# Patient Record
Sex: FEMALE | Race: BLACK OR AFRICAN AMERICAN | NOT HISPANIC OR LATINO | Employment: UNEMPLOYED | URBAN - METROPOLITAN AREA
[De-identification: names, ages, dates, MRNs, and addresses within clinical notes are randomized per-mention and may not be internally consistent; named-entity substitution may affect disease eponyms.]

---

## 2023-03-15 LAB
THYROPEROXIDASE AB (IU/ML) IN SER/PLAS: 58 IU/ML
THYROTROPIN (MIU/L) IN SER/PLAS BY DETECTION LIMIT <= 0.05 MIU/L: 0.47 MIU/L (ref 0.44–3.98)
THYROXINE (T4) FREE (NG/DL) IN SER/PLAS: 0.88 NG/DL (ref 0.78–1.48)
TRIIODOTHYRONINE (T3) (NG/DL) IN SER/PLAS: 145 NG/DL (ref 60–200)

## 2023-03-18 LAB
THYROGLOBULIN AB (IU/ML) IN SER/PLAS: <0.9 IU/ML (ref 0–4)
THYROGLOBULIN LC-MS/MS: ABNORMAL NG/ML (ref 1.3–31.8)
THYROGLOBULIN: 647.7 NG/ML (ref 1.3–31.8)

## 2023-03-22 LAB — THYROID STIMULATING IMMUNOGLOBULIN: <1 TSI INDEX

## 2023-03-29 ENCOUNTER — HOSPITAL ENCOUNTER (OUTPATIENT)
Dept: DATA CONVERSION | Facility: HOSPITAL | Age: 63
End: 2023-03-29
Attending: STUDENT IN AN ORGANIZED HEALTH CARE EDUCATION/TRAINING PROGRAM | Admitting: STUDENT IN AN ORGANIZED HEALTH CARE EDUCATION/TRAINING PROGRAM
Payer: COMMERCIAL

## 2023-03-29 DIAGNOSIS — E04.2 NONTOXIC MULTINODULAR GOITER: ICD-10-CM

## 2023-03-29 DIAGNOSIS — E78.5 HYPERLIPIDEMIA, UNSPECIFIED: ICD-10-CM

## 2023-03-29 DIAGNOSIS — E04.9 NONTOXIC GOITER, UNSPECIFIED: ICD-10-CM

## 2023-03-29 DIAGNOSIS — I10 ESSENTIAL (PRIMARY) HYPERTENSION: ICD-10-CM

## 2023-03-29 DIAGNOSIS — I71.40 ABDOMINAL AORTIC ANEURYSM, WITHOUT RUPTURE, UNSPECIFIED (CMS-HCC): ICD-10-CM

## 2023-03-29 DIAGNOSIS — Z79.899 OTHER LONG TERM (CURRENT) DRUG THERAPY: ICD-10-CM

## 2023-03-30 LAB
COMPLETE PATHOLOGY REPORT: NORMAL
CONVERTED CLINICAL DIAGNOSIS-HISTORY: NORMAL
CONVERTED DIAGNOSIS COMMENT: NORMAL
CONVERTED FINAL DIAGNOSIS: NORMAL
CONVERTED FINAL REPORT PDF LINK TO COPY AND PASTE: NORMAL
CONVERTED SPECIMEN DESCRIPTION: NORMAL

## 2023-06-23 ENCOUNTER — TELEPHONE (OUTPATIENT)
Dept: PRIMARY CARE | Facility: CLINIC | Age: 63
End: 2023-06-23
Payer: COMMERCIAL

## 2023-06-26 DIAGNOSIS — E11.9 TYPE 2 DIABETES MELLITUS WITHOUT COMPLICATION, WITHOUT LONG-TERM CURRENT USE OF INSULIN (MULTI): Primary | ICD-10-CM

## 2023-06-26 RX ORDER — METFORMIN HYDROCHLORIDE 500 MG/1
500 TABLET, EXTENDED RELEASE ORAL 2 TIMES DAILY
Qty: 30 TABLET | Refills: 5 | Status: SHIPPED | OUTPATIENT
Start: 2023-06-26 | End: 2023-09-25 | Stop reason: SDUPTHER

## 2023-06-26 RX ORDER — METFORMIN HYDROCHLORIDE 500 MG/1
500 TABLET, EXTENDED RELEASE ORAL 2 TIMES DAILY
COMMUNITY
End: 2023-06-26 | Stop reason: SDUPTHER

## 2023-08-11 RX ORDER — HYDROCHLOROTHIAZIDE 25 MG/1
25 TABLET ORAL DAILY
Qty: 90 TABLET | OUTPATIENT
Start: 2023-08-11

## 2023-08-11 NOTE — TELEPHONE ENCOUNTER
Per last note, patient was started on lisinopril-hydrochlorothiazide 20-25 mg and discontinued hydrochlorothiazide 25 mg. Therefore did not refill medication. If she needs the other anti-hypertension medication refilled let me know.

## 2023-08-15 DIAGNOSIS — I10 HYPERTENSION, UNSPECIFIED TYPE: Primary | ICD-10-CM

## 2023-08-15 RX ORDER — LISINOPRIL AND HYDROCHLOROTHIAZIDE 20; 25 MG/1; MG/1
1 TABLET ORAL DAILY
COMMUNITY
Start: 2023-06-18 | End: 2023-08-15 | Stop reason: SDUPTHER

## 2023-08-15 RX ORDER — HYDROCHLOROTHIAZIDE 25 MG/1
25 TABLET ORAL DAILY
Qty: 90 TABLET | OUTPATIENT
Start: 2023-08-15

## 2023-08-15 RX ORDER — LISINOPRIL AND HYDROCHLOROTHIAZIDE 20; 25 MG/1; MG/1
1 TABLET ORAL DAILY
Qty: 30 TABLET | Refills: 1 | Status: SHIPPED | OUTPATIENT
Start: 2023-08-15 | End: 2023-08-22

## 2023-08-15 NOTE — TELEPHONE ENCOUNTER
Patient was started on lisinopril-hydrochlorothiazide 20-25 mg and discontinue hydrochlorothiazide 25 mg at last visit with Brandon. You can call patient that I refilled combo medication instead and she will need establish with new physician.

## 2023-08-16 NOTE — TELEPHONE ENCOUNTER
Patient states that her pharmacy told her that she does not need the combo med and only needs the Hydrochlorothiazide by itself

## 2023-08-17 ENCOUNTER — TELEPHONE (OUTPATIENT)
Dept: PRIMARY CARE | Facility: CLINIC | Age: 63
End: 2023-08-17
Payer: COMMERCIAL

## 2023-08-17 RX ORDER — HYDROCHLOROTHIAZIDE 25 MG/1
25 TABLET ORAL DAILY
OUTPATIENT
Start: 2023-08-17

## 2023-08-17 RX ORDER — HYDROCHLOROTHIAZIDE 25 MG/1
1 TABLET ORAL DAILY
COMMUNITY
Start: 2019-09-30 | End: 2023-08-22

## 2023-08-17 NOTE — TELEPHONE ENCOUNTER
Pt has called 3 times today stating that she needs her Hydrochlorothiazide. She mentioned that it was sent wrong.

## 2023-08-18 RX ORDER — HYDROCHLOROTHIAZIDE 25 MG/1
25 TABLET ORAL DAILY
Qty: 90 TABLET | OUTPATIENT
Start: 2023-08-18

## 2023-08-18 NOTE — TELEPHONE ENCOUNTER
Patient states that she needs the prescription of Hydrochlorothiazide sent by itself not in combo with lisinopril

## 2023-08-22 DIAGNOSIS — I10 PRIMARY HYPERTENSION: Primary | ICD-10-CM

## 2023-08-22 RX ORDER — HYDROCHLOROTHIAZIDE 25 MG/1
25 TABLET ORAL DAILY
Qty: 90 TABLET | Refills: 0 | Status: SHIPPED | OUTPATIENT
Start: 2023-08-22 | End: 2023-12-27 | Stop reason: WASHOUT

## 2023-08-22 NOTE — PROGRESS NOTES
Patient called and requested refill on hydrochlorothiazide, not combination pill. Last seen Nov 2022. Rx for 3 months, requires in person FUV

## 2023-09-25 ENCOUNTER — LAB (OUTPATIENT)
Dept: LAB | Facility: LAB | Age: 63
End: 2023-09-25
Payer: COMMERCIAL

## 2023-09-25 ENCOUNTER — OFFICE VISIT (OUTPATIENT)
Dept: PRIMARY CARE | Facility: CLINIC | Age: 63
End: 2023-09-25
Payer: COMMERCIAL

## 2023-09-25 VITALS
TEMPERATURE: 98.4 F | HEART RATE: 75 BPM | DIASTOLIC BLOOD PRESSURE: 81 MMHG | BODY MASS INDEX: 24.59 KG/M2 | OXYGEN SATURATION: 99 % | WEIGHT: 133.6 LBS | HEIGHT: 62 IN | SYSTOLIC BLOOD PRESSURE: 164 MMHG

## 2023-09-25 DIAGNOSIS — Z00.00 ROUTINE ADULT HEALTH MAINTENANCE: ICD-10-CM

## 2023-09-25 DIAGNOSIS — E13.9 DIABETES 1.5, MANAGED AS TYPE 2 (MULTI): ICD-10-CM

## 2023-09-25 DIAGNOSIS — E13.9 DIABETES 1.5, MANAGED AS TYPE 2 (MULTI): Primary | ICD-10-CM

## 2023-09-25 DIAGNOSIS — I10 HYPERTENSION, UNSPECIFIED TYPE: ICD-10-CM

## 2023-09-25 DIAGNOSIS — E11.9 TYPE 2 DIABETES MELLITUS WITHOUT COMPLICATION, WITHOUT LONG-TERM CURRENT USE OF INSULIN (MULTI): ICD-10-CM

## 2023-09-25 LAB
ALBUMIN (G/DL) IN SER/PLAS: 4.3 G/DL (ref 3.4–5)
ANION GAP IN SER/PLAS: 13 MMOL/L (ref 10–20)
CALCIUM (MG/DL) IN SER/PLAS: 9.9 MG/DL (ref 8.6–10.6)
CARBON DIOXIDE, TOTAL (MMOL/L) IN SER/PLAS: 30 MMOL/L (ref 21–32)
CHLORIDE (MMOL/L) IN SER/PLAS: 103 MMOL/L (ref 98–107)
CHOLESTEROL (MG/DL) IN SER/PLAS: 219 MG/DL (ref 0–199)
CHOLESTEROL IN HDL (MG/DL) IN SER/PLAS: 54.1 MG/DL
CHOLESTEROL/HDL RATIO: 4
CREATININE (MG/DL) IN SER/PLAS: 0.92 MG/DL (ref 0.5–1.05)
ESTIMATED AVERAGE GLUCOSE FOR HBA1C: 134 MG/DL
GFR FEMALE: 70 ML/MIN/1.73M2
GLUCOSE (MG/DL) IN SER/PLAS: 95 MG/DL (ref 74–99)
HEMOGLOBIN A1C/HEMOGLOBIN TOTAL IN BLOOD: 6.3 %
NON-HDL CHOLESTEROL: 165 MG/DL
PHOSPHATE (MG/DL) IN SER/PLAS: 3.4 MG/DL (ref 2.5–4.9)
POTASSIUM (MMOL/L) IN SER/PLAS: 3.5 MMOL/L (ref 3.5–5.3)
SODIUM (MMOL/L) IN SER/PLAS: 142 MMOL/L (ref 136–145)
UREA NITROGEN (MG/DL) IN SER/PLAS: 17 MG/DL (ref 6–23)

## 2023-09-25 PROCEDURE — 1036F TOBACCO NON-USER: CPT

## 2023-09-25 PROCEDURE — 99213 OFFICE O/P EST LOW 20 MIN: CPT

## 2023-09-25 PROCEDURE — 82465 ASSAY BLD/SERUM CHOLESTEROL: CPT

## 2023-09-25 PROCEDURE — 83036 HEMOGLOBIN GLYCOSYLATED A1C: CPT

## 2023-09-25 PROCEDURE — 3079F DIAST BP 80-89 MM HG: CPT

## 2023-09-25 PROCEDURE — 3077F SYST BP >= 140 MM HG: CPT

## 2023-09-25 PROCEDURE — 83718 ASSAY OF LIPOPROTEIN: CPT

## 2023-09-25 PROCEDURE — 90686 IIV4 VACC NO PRSV 0.5 ML IM: CPT

## 2023-09-25 PROCEDURE — 36415 COLL VENOUS BLD VENIPUNCTURE: CPT

## 2023-09-25 PROCEDURE — 80069 RENAL FUNCTION PANEL: CPT

## 2023-09-25 PROCEDURE — 3044F HG A1C LEVEL LT 7.0%: CPT

## 2023-09-25 PROCEDURE — 90471 IMMUNIZATION ADMIN: CPT

## 2023-09-25 RX ORDER — IBUPROFEN 800 MG/1
TABLET ORAL
COMMUNITY
Start: 2023-09-08 | End: 2024-01-15 | Stop reason: WASHOUT

## 2023-09-25 RX ORDER — LISINOPRIL AND HYDROCHLOROTHIAZIDE 20; 25 MG/1; MG/1
1 TABLET ORAL DAILY
COMMUNITY
Start: 2023-09-12 | End: 2023-12-19 | Stop reason: SDUPTHER

## 2023-09-25 RX ORDER — METFORMIN HYDROCHLORIDE 500 MG/1
500 TABLET, EXTENDED RELEASE ORAL 2 TIMES DAILY
Qty: 30 TABLET | Refills: 5 | Status: SHIPPED | OUTPATIENT
Start: 2023-09-25 | End: 2024-01-02 | Stop reason: SDUPTHER

## 2023-09-25 ASSESSMENT — PATIENT HEALTH QUESTIONNAIRE - PHQ9
1. LITTLE INTEREST OR PLEASURE IN DOING THINGS: NOT AT ALL
SUM OF ALL RESPONSES TO PHQ9 QUESTIONS 1 AND 2: 0
2. FEELING DOWN, DEPRESSED OR HOPELESS: NOT AT ALL

## 2023-09-25 ASSESSMENT — ENCOUNTER SYMPTOMS: DEPRESSION: 0

## 2023-09-25 ASSESSMENT — PAIN SCALES - GENERAL: PAINLEVEL: 0-NO PAIN

## 2023-09-25 NOTE — PROGRESS NOTES
Patient ID: 60 yo F w/ PMHx. DM, HTN, DLD and Goiter who presents for follow-up.     Concerns: none    #HTN   - IO BP of 164/81. Rechecked BP-->171/94  - asymptomatic.   - she does endorse drinking 1/2 cup of coffee before coming to appointment.   - per speaking with patient there appears to be misunderstanding regarding her appropriate medication regimen. At last clinic visit, pt was advised to stop taking her hydrochlorothiazide and instead take the combination pill of lisinopril-hydrochlorothiazide. However the pt had instead continued taking the hydrochlorothiazide mono therapy. This discrepancy was communicated to the patient at length. However it appears the patient can become easily confused about her medications. She states that she lived with her granddaughter at home and she takes care of her own medications. She states that she knows how many medications she needs to take and appears to be very compliant. However she states that the pharmacy had give her both the combination pill and the hydrochlorothiazide pill which led to the confusion. If she is given only the correct medication form the pharmacy she will be able to take the correct medication as directed.   - pt taking hydrochlorothiazide 25 mg,    # c/f Weight loss   - lost 4 lbs since 3/2023  - weight appears to be largely stable and this was communicated to the patient as well as her normal BMI.      #Type 2 diabetes, dyslipidemia  -Last hemoglobin A1c 6.3(11/2022)   -Controlled on metformin 500 mg twice daily  - eats lots of salad with ranch, fruits (pineapple, watermelon)   - cutting adrian on meat.      #thyroid nodule biopsy   #hx of nontoxic multinodular goiter.   - normal biopsy per patient. However path results not able to be located on chart review.   - follows with endocrine.   Has known history of nontoxic multinodular goiter as far back as 2017   most recent thyroid ultrasound done on 12/2022   right midpole nodule 2.4cm solid  per  patient had a F[facility] at [facility] years ago and was reportedly negative   no history of abnormal TFTs in the past     #Depression  - PHQ-2 -ve      Preventative:  -Last Pap (21-65): 2019 not indicated   -Last mammogram: ordered  -Last Colonoscopy (50-75): 2025   -Last LDCT (55-80): n/a  -Last Dental: recommended follow up  -Last Eye exam: recommended follow up  -Last DEXA (65+F): n/a  -Exercise: pt does resistance exercises at home. Encouraged this to increase bone health  -Diet: Pt eats a varied and balanced diet. Counseled on ranch dressing and its high sugar content. Counseled regarding switching to alternative dressing such as italian or a honey+ lemon dressing.   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 constipation  -No changes in urination  -Organ systems reviewed & negative, except as mentioned in HPI    PMH, PSH, SoHx, FamHx and Medication reviewed and edited as indicated.     PHYSICAL EXAM:  -General:  alert, pleasant   -Skin:  no rashes or lesions  -HEENT:  NCAT. moist mucosa.   -Cardiovascular:  Regular rate and rhythm, normal S1 and S2, no gallops, no murmurs and no pericardial rub.  -Pulmonary:  Clear bilateral breath sounds. good chest rise B/L. speaks in full sentences.  -Abdomen:  (+) BS. soft. non-tender. non-distended.  -Neurologic:  grossly intact.  -Musculoskeletal:  Normal gait. Normal Stance. full range of motion in all extremities.  -Psychiatric:  appropriate mood/ affect     ASSESSMENT:  62 yo F w/ PMHx. DM, HTN, DLD and Goiter who presents for  follow-up.     # HTN   - BP not at goal w/ elevated BP of 164/81 and 171/94 on repeat. Pt asymptomatic.   - pt taking hydrochlorothiazide monotherapy instead of lisinopril-hydrochlorothiazide.  -  pt counseled to take combination medication and take home BP's. Counseled pt on appropriate way to take home BP.   - Obtain RFP to assess renal function iso elevated BP    #DMII   - c/w metformin 500 mg BID.   - HbA1c of 6.3 ( 11/2022)   - obtain updated HbA1c     #HM   - Obtain mammogram   - Flu vaccination administered    RTC in 1 month for close BP follow-up     Discussed with attending, Dr. Spencer Charles MD, MPH   Family Medicine and Preventive Health, PGY-2

## 2023-09-25 NOTE — PATIENT INSTRUCTIONS
Thank you for coming in to see us today, Ms. Lupis Staton! It was a pleasure managing your health together.    Today in clinic, we discussed your blood pressure medication. Please take the lisinopril- hydrochlorothiazide pill starting tomorrow.I will discontinue the hydrochlorothiazide.     I ordered a mammogram for you. Please call the number below for an appointment.     Scheduling Phone Numbers:  - Specialty Provider: [phone]  - Heart/Vascular Specialist: [phone]  - [phone]  - PT/OT: [phone]  - Colonoscopy: [phone]     If we ordered bloodwork today, you can get this done at ANY  location and the results will come to me directly. The [facility] here at [facility] are walk-in (no appointment needed); [facility]'s hours are M-F 6:30a-6p and Sat 8a-12p.    If you have any questions/concerns, you can always use e[employer] to message me directly. Or if you prefer, you can call the office at [phone]; if you leave a message, the office staff should translate this to a message in my inbox.    I'm looking forward to seeing you back in clinic in 1 month to discuss your blood pressure .    Best,  Dr. Charles

## 2023-09-28 NOTE — PROGRESS NOTES
I saw and evaluated the patient. I personally obtained the key and critical portions of the history and physical exam or was physically present for key and critical portions performed by the resident/fellow. I reviewed the resident/fellow's documentation and discussed the patient with the resident/fellow. I agree with the resident/fellow's medical decision making as documented in the note.    Odell Palmer MD

## 2023-10-04 ENCOUNTER — APPOINTMENT (OUTPATIENT)
Dept: PRIMARY CARE | Facility: CLINIC | Age: 63
End: 2023-10-04
Payer: COMMERCIAL

## 2023-12-19 ENCOUNTER — TELEPHONE (OUTPATIENT)
Dept: PRIMARY CARE | Facility: CLINIC | Age: 63
End: 2023-12-19

## 2023-12-19 DIAGNOSIS — I10 HYPERTENSION, UNSPECIFIED TYPE: Primary | ICD-10-CM

## 2023-12-19 NOTE — TELEPHONE ENCOUNTER
Pt called requesting refill of lisinopril-hydrochlorothiazide. Order pended and routed to provider.

## 2023-12-20 RX ORDER — LISINOPRIL AND HYDROCHLOROTHIAZIDE 20; 25 MG/1; MG/1
1 TABLET ORAL DAILY
Qty: 30 TABLET | Refills: 3 | Status: SHIPPED | OUTPATIENT
Start: 2023-12-20 | End: 2023-12-27 | Stop reason: SDUPTHER

## 2023-12-27 DIAGNOSIS — I10 HYPERTENSION, UNSPECIFIED TYPE: ICD-10-CM

## 2023-12-27 RX ORDER — LISINOPRIL AND HYDROCHLOROTHIAZIDE 20; 25 MG/1; MG/1
1 TABLET ORAL DAILY
Qty: 30 TABLET | Refills: 11 | Status: SHIPPED | OUTPATIENT
Start: 2023-12-27 | End: 2024-01-15 | Stop reason: SDUPTHER

## 2024-01-02 DIAGNOSIS — E11.9 TYPE 2 DIABETES MELLITUS WITHOUT COMPLICATION, WITHOUT LONG-TERM CURRENT USE OF INSULIN (MULTI): ICD-10-CM

## 2024-01-02 RX ORDER — METFORMIN HYDROCHLORIDE 500 MG/1
500 TABLET, EXTENDED RELEASE ORAL 2 TIMES DAILY
Qty: 30 TABLET | Refills: 5 | Status: SHIPPED | OUTPATIENT
Start: 2024-01-02 | End: 2024-01-15 | Stop reason: SDUPTHER

## 2024-01-15 ENCOUNTER — LAB (OUTPATIENT)
Dept: LAB | Facility: LAB | Age: 64
End: 2024-01-15
Payer: COMMERCIAL

## 2024-01-15 ENCOUNTER — OFFICE VISIT (OUTPATIENT)
Dept: PRIMARY CARE | Facility: CLINIC | Age: 64
End: 2024-01-15
Payer: COMMERCIAL

## 2024-01-15 VITALS
DIASTOLIC BLOOD PRESSURE: 77 MMHG | HEART RATE: 73 BPM | WEIGHT: 137.6 LBS | SYSTOLIC BLOOD PRESSURE: 148 MMHG | OXYGEN SATURATION: 98 % | TEMPERATURE: 98.7 F | BODY MASS INDEX: 25.32 KG/M2 | HEIGHT: 62 IN

## 2024-01-15 DIAGNOSIS — E11.9 TYPE 2 DIABETES MELLITUS WITHOUT COMPLICATION, WITHOUT LONG-TERM CURRENT USE OF INSULIN (MULTI): ICD-10-CM

## 2024-01-15 DIAGNOSIS — I10 HYPERTENSION, UNSPECIFIED TYPE: ICD-10-CM

## 2024-01-15 DIAGNOSIS — E78.5 HYPERLIPIDEMIA, UNSPECIFIED HYPERLIPIDEMIA TYPE: Primary | ICD-10-CM

## 2024-01-15 PROBLEM — K59.00 CONSTIPATION: Status: ACTIVE | Noted: 2024-01-15

## 2024-01-15 PROBLEM — M25.561 BILATERAL KNEE PAIN: Status: ACTIVE | Noted: 2024-01-15

## 2024-01-15 PROBLEM — M25.562 BILATERAL KNEE PAIN: Status: ACTIVE | Noted: 2024-01-15

## 2024-01-15 LAB
ALBUMIN SERPL BCP-MCNC: 4.5 G/DL (ref 3.4–5)
ANION GAP SERPL CALC-SCNC: 14 MMOL/L (ref 10–20)
BUN SERPL-MCNC: 16 MG/DL (ref 6–23)
CALCIUM SERPL-MCNC: 10.2 MG/DL (ref 8.6–10.6)
CHLORIDE SERPL-SCNC: 103 MMOL/L (ref 98–107)
CO2 SERPL-SCNC: 29 MMOL/L (ref 21–32)
CREAT SERPL-MCNC: 0.95 MG/DL (ref 0.5–1.05)
EGFRCR SERPLBLD CKD-EPI 2021: 67 ML/MIN/1.73M*2
EST. AVERAGE GLUCOSE BLD GHB EST-MCNC: 131 MG/DL
GLUCOSE SERPL-MCNC: 94 MG/DL (ref 74–99)
HBA1C MFR BLD: 6.2 %
PHOSPHATE SERPL-MCNC: 3.6 MG/DL (ref 2.5–4.9)
POTASSIUM SERPL-SCNC: 4.6 MMOL/L (ref 3.5–5.3)
SODIUM SERPL-SCNC: 141 MMOL/L (ref 136–145)

## 2024-01-15 PROCEDURE — 3078F DIAST BP <80 MM HG: CPT

## 2024-01-15 PROCEDURE — 99214 OFFICE O/P EST MOD 30 MIN: CPT

## 2024-01-15 PROCEDURE — 3044F HG A1C LEVEL LT 7.0%: CPT

## 2024-01-15 PROCEDURE — 36415 COLL VENOUS BLD VENIPUNCTURE: CPT

## 2024-01-15 PROCEDURE — 3077F SYST BP >= 140 MM HG: CPT

## 2024-01-15 PROCEDURE — 83036 HEMOGLOBIN GLYCOSYLATED A1C: CPT

## 2024-01-15 PROCEDURE — 80069 RENAL FUNCTION PANEL: CPT

## 2024-01-15 PROCEDURE — 1036F TOBACCO NON-USER: CPT

## 2024-01-15 RX ORDER — LISINOPRIL AND HYDROCHLOROTHIAZIDE 20; 25 MG/1; MG/1
1 TABLET ORAL DAILY
Qty: 90 TABLET | Refills: 3 | Status: SHIPPED | OUTPATIENT
Start: 2024-01-15 | End: 2025-01-14

## 2024-01-15 RX ORDER — AMLODIPINE BESYLATE 5 MG/1
5 TABLET ORAL DAILY
Qty: 30 TABLET | Refills: 1 | Status: SHIPPED | OUTPATIENT
Start: 2024-01-15 | End: 2024-03-19 | Stop reason: SDUPTHER

## 2024-01-15 RX ORDER — ATORVASTATIN CALCIUM 40 MG/1
40 TABLET, FILM COATED ORAL DAILY
Qty: 30 TABLET | Refills: 1 | Status: SHIPPED | OUTPATIENT
Start: 2024-01-15 | End: 2024-02-29 | Stop reason: SDUPTHER

## 2024-01-15 RX ORDER — METFORMIN HYDROCHLORIDE 500 MG/1
500 TABLET, EXTENDED RELEASE ORAL 2 TIMES DAILY
Qty: 180 TABLET | Refills: 3 | Status: SHIPPED | OUTPATIENT
Start: 2024-01-15 | End: 2025-01-14

## 2024-01-15 ASSESSMENT — PAIN SCALES - GENERAL: PAINLEVEL: 0-NO PAIN

## 2024-01-15 NOTE — PROGRESS NOTES
"Subjective   Patient ID: Lupis Staton is a 63 y.o. female who presents for Follow-up.    HPI     HTN  - patient endorse compliance with medication (combinations lisinopril-hydrochlorothiazide 20-25mg every day)  - denies any issues with medication   - does not check BP at home  - denies CP, SOB, headache, palpitations or leg swelling    T2DM  - denies any polyuria and dolpsia   - endorses compliance with metformin   - does not check blood sugars at home       Review of Systems   Constitutional: Negative.    HENT: Negative.     Eyes: Negative.    Respiratory: Negative.     Cardiovascular: Negative.    Gastrointestinal: Negative.    Endocrine: Negative.    Genitourinary: Negative.    Musculoskeletal: Negative.    Skin: Negative.    Allergic/Immunologic: Negative.    Neurological: Negative.    Hematological: Negative.    Psychiatric/Behavioral: Negative.         Objective   /77   Pulse 73   Temp 37.1 °C (98.7 °F) (Tympanic)   Ht 1.575 m (5' 2\")   Wt 62.4 kg (137 lb 9.6 oz)   SpO2 98%   BMI 25.17 kg/m²     153/89    Physical Exam  Vitals reviewed.   Constitutional:       General: She is not in acute distress.     Appearance: Normal appearance. She is normal weight. She is not ill-appearing, toxic-appearing or diaphoretic.   HENT:      Head: Normocephalic and atraumatic.      Right Ear: External ear normal.      Left Ear: External ear normal.      Nose: Nose normal.      Mouth/Throat:      Mouth: Mucous membranes are moist.      Pharynx: Oropharynx is clear.   Eyes:      Conjunctiva/sclera: Conjunctivae normal.   Neck:      Vascular: No carotid bruit.   Cardiovascular:      Rate and Rhythm: Normal rate and regular rhythm.      Pulses: Normal pulses.      Heart sounds: Normal heart sounds. No murmur heard.     No friction rub. No gallop.   Pulmonary:      Effort: Pulmonary effort is normal. No respiratory distress.      Breath sounds: Normal breath sounds. No stridor. No wheezing, rhonchi or rales.   Chest:    "   Chest wall: No tenderness.   Abdominal:      General: Abdomen is flat. Bowel sounds are normal. There is no distension.      Palpations: Abdomen is soft.      Tenderness: There is no abdominal tenderness. There is no guarding.   Musculoskeletal:         General: Normal range of motion.      Cervical back: Normal range of motion and neck supple. No rigidity or tenderness.      Right lower leg: No edema.      Left lower leg: No edema.   Lymphadenopathy:      Cervical: No cervical adenopathy.   Skin:     General: Skin is warm and dry.      Capillary Refill: Capillary refill takes less than 2 seconds.   Neurological:      General: No focal deficit present.      Mental Status: She is alert and oriented to person, place, and time.      Cranial Nerves: No cranial nerve deficit.   Psychiatric:         Mood and Affect: Mood normal.         Behavior: Behavior normal.         Thought Content: Thought content normal.         Judgment: Judgment normal.       Assessment/Plan   Lupis england is 64 y/o F w/ HTN, controlled T2DM (A1C 6.3%, 9/25/23), and DLD who presented today for regular follow up with no acute concerns.  Plan below:    Diagnoses and all orders for this visit:  Hyperlipidemia, unspecified hyperlipidemia type  - Discussed with patient that based on her her ASCVD score of 30%, that would recommend starting statin therapy due decrease her risk of stroke with her poorly controlled HTN and well controlled T2DM.  After shared decision making patient was amenable to starting 40mg atorvastatin daily, with a plan on increasing to 80mg at [facility].  - follow up with patient in regards to starting statin therapy   -     atorvastatin (Lipitor) 40 mg tablet; Take 1 tablet (40 mg) by mouth once daily.  Hypertension, unspecified type  - IO BP: 153/89, and on repeat 148/77, above goal of >130/80, patient asx   - Recommended adding amlodipine 5mg every day to patient current HTN regiment, patient amenable   - New HTN regiment:  Amlodipine 5mg every day, lisinopril-hydrochlorothiazide 20-25mg every day   - Patient prefers taking as few medication as possible, endorsing that prefers combination medication.  Will work to accommodate patient, when BP is better controlled   -     Follow Up In Primary Care  -     Renal function panel; Future  -     amLODIPine (Norvasc) 5 mg tablet; Take 1 tablet (5 mg) by mouth once daily.  -     lisinopriL-hydrochlorothiazide 20-25 mg tablet; Take 1 tablet by mouth once daily.  Type 2 diabetes mellitus without complication, without long-term current use of insulin (CMS/[facility])  -     Renal function panel; Future  -     Hemoglobin A1c; Future  -     atorvastatin (Lipitor) 40 mg tablet; Take 1 tablet (40 mg) by mouth once daily.  -     metFORMIN  mg 24 hr tablet; Take 1 tablet (500 mg) by mouth 2 times a day.  Other orders  -     Follow Up In Primary Care - Established; Future     **RTC in 4 weeks for follow up on HTN and initiating statin therapy, or sooner if needed     Patient was discussed and examined with Dr. Charles Seymour MD MS  PGY-2 Family Medicine

## 2024-01-21 ASSESSMENT — ENCOUNTER SYMPTOMS
CONSTITUTIONAL NEGATIVE: 1
RESPIRATORY NEGATIVE: 1
NEUROLOGICAL NEGATIVE: 1
EYES NEGATIVE: 1
CARDIOVASCULAR NEGATIVE: 1
ENDOCRINE NEGATIVE: 1
ALLERGIC/IMMUNOLOGIC NEGATIVE: 1
PSYCHIATRIC NEGATIVE: 1
GASTROINTESTINAL NEGATIVE: 1
MUSCULOSKELETAL NEGATIVE: 1
HEMATOLOGIC/LYMPHATIC NEGATIVE: 1

## 2024-01-22 NOTE — PROGRESS NOTES
I saw and evaluated the patient. I personally obtained the key and critical portions of the history and physical exam or was physically present for key and critical portions performed by the resident/fellow. I reviewed the resident/fellow's documentation and discussed the patient with the resident/fellow. I agree with the resident/fellow's medical decision making as documented in the note. Will need to ensure that her eye care and foot care are up to date.    Bouchra Soto MD

## 2024-02-06 ENCOUNTER — APPOINTMENT (OUTPATIENT)
Dept: RADIOLOGY | Facility: HOSPITAL | Age: 64
End: 2024-02-06
Payer: COMMERCIAL

## 2024-02-07 ENCOUNTER — TELEPHONE (OUTPATIENT)
Dept: PRIMARY CARE | Facility: CLINIC | Age: 64
End: 2024-02-07
Payer: COMMERCIAL

## 2024-02-07 NOTE — TELEPHONE ENCOUNTER
Result Communication    Resulted Orders   Renal function panel   Result Value Ref Range    Glucose 94 74 - 99 mg/dL    Sodium 141 136 - 145 mmol/L    Potassium 4.6 3.5 - 5.3 mmol/L    Chloride 103 98 - 107 mmol/L    Bicarbonate 29 21 - 32 mmol/L    Anion Gap 14 10 - 20 mmol/L    Urea Nitrogen 16 6 - 23 mg/dL    Creatinine 0.95 0.50 - 1.05 mg/dL    eGFR 67 >60 mL/min/1.73m*2      Comment:      Calculations of estimated GFR are performed using the 2021 CKD-EPI Study Refit equation without the race variable for the IDMS-Traceable creatinine methods.  https://jasn.asnjournals.org/content/early/2021/09/22/ASN.9617770870    Calcium 10.2 8.6 - 10.6 mg/dL    Phosphorus 3.6 2.5 - 4.9 mg/dL      Comment:      The performance characteristics of phosphorus testing in heparinized plasma have been validated by the individual  laboratory site where testing is performed. Testing on heparinized plasma is not approved by the FDA; however, such approval is not necessary.    Albumin 4.5 3.4 - 5.0 g/dL   Hemoglobin A1c   Result Value Ref Range    Hemoglobin A1C 6.2 (H) see below %      Comment:      Hemoglobin variant detected which does not interfere with determination of Hemoglobin A1c. Hemoglobin identification can be ordered to characterize the variant if clinically indicated.    Estimated Average Glucose 131 Not Established mg/dL    Narrative    Diagnosis of Diabetes-Adults  Non-Diabetic: < or = 5.6%  Increased risk for developing diabetes: 5.7-6.4%  Diagnostic of diabetes: > or = 6.5%    Monitoring of Diabetes  Age (y)....................... Therapeutic Goal (%)  Adults: >18.........................<7.0  Pediatrics: 13-18...................<7.5  Pediatrics: 7-12....................<8.0  Pediatrics: 0-6..................... 7.5-8.5    American Diabetes Association. Diabetes Care 33(S1), Jan 2010           2:18 PM      Results were successfully communicated with the patient and they acknowledged their understanding.  Discussed the  improvement of the patient hemoglobin A1C from 6.3% to 6.2%, encouraged patient to continue with her life style changes and medication therapy.  Reminded patient of upcoming appointment with me on 2/29/2024 at 9:30 AM.  Plan on discussing additional diabetic care at that visit including annual ophthalmologic and podiatry care.    BILLY Seymour MD MS  PGY-2 Family Medicine

## 2024-02-16 ENCOUNTER — HOSPITAL ENCOUNTER (OUTPATIENT)
Dept: RADIOLOGY | Facility: HOSPITAL | Age: 64
Discharge: HOME | End: 2024-02-16
Payer: COMMERCIAL

## 2024-02-16 DIAGNOSIS — Z00.00 ROUTINE ADULT HEALTH MAINTENANCE: ICD-10-CM

## 2024-02-16 PROCEDURE — 77067 SCR MAMMO BI INCL CAD: CPT

## 2024-02-16 PROCEDURE — 77063 BREAST TOMOSYNTHESIS BI: CPT | Mod: BILATERAL PROCEDURE | Performed by: RADIOLOGY

## 2024-02-16 PROCEDURE — 77067 SCR MAMMO BI INCL CAD: CPT | Mod: BILATERAL PROCEDURE | Performed by: RADIOLOGY

## 2024-02-29 ENCOUNTER — OFFICE VISIT (OUTPATIENT)
Dept: PRIMARY CARE | Facility: CLINIC | Age: 64
End: 2024-02-29
Payer: COMMERCIAL

## 2024-02-29 VITALS
SYSTOLIC BLOOD PRESSURE: 134 MMHG | DIASTOLIC BLOOD PRESSURE: 86 MMHG | TEMPERATURE: 96 F | HEART RATE: 81 BPM | OXYGEN SATURATION: 94 % | WEIGHT: 140.5 LBS | BODY MASS INDEX: 25.86 KG/M2 | HEIGHT: 62 IN

## 2024-02-29 DIAGNOSIS — Z23 ENCOUNTER FOR IMMUNIZATION: ICD-10-CM

## 2024-02-29 DIAGNOSIS — E11.9 TYPE 2 DIABETES MELLITUS WITHOUT COMPLICATION, WITHOUT LONG-TERM CURRENT USE OF INSULIN (MULTI): ICD-10-CM

## 2024-02-29 DIAGNOSIS — E78.5 HYPERLIPIDEMIA, UNSPECIFIED HYPERLIPIDEMIA TYPE: Primary | ICD-10-CM

## 2024-02-29 PROCEDURE — 1036F TOBACCO NON-USER: CPT

## 2024-02-29 PROCEDURE — 3075F SYST BP GE 130 - 139MM HG: CPT

## 2024-02-29 PROCEDURE — 99214 OFFICE O/P EST MOD 30 MIN: CPT

## 2024-02-29 PROCEDURE — 90677 PCV20 VACCINE IM: CPT

## 2024-02-29 PROCEDURE — 90471 IMMUNIZATION ADMIN: CPT

## 2024-02-29 PROCEDURE — 3078F DIAST BP <80 MM HG: CPT

## 2024-02-29 PROCEDURE — 3044F HG A1C LEVEL LT 7.0%: CPT

## 2024-02-29 RX ORDER — ATORVASTATIN CALCIUM 40 MG/1
40 TABLET, FILM COATED ORAL DAILY
Qty: 90 TABLET | Refills: 3 | Status: SHIPPED | OUTPATIENT
Start: 2024-02-29 | End: 2025-02-28

## 2024-02-29 ASSESSMENT — PAIN SCALES - GENERAL: PAINLEVEL: 0-NO PAIN

## 2024-02-29 NOTE — PROGRESS NOTES
Patient ID: Lupis Staton is a 63 y.o. female with PMH of T2D (on metformin), HLD (on atorvastatin) , HTN (on amlodipine, lisinopril-hydrochlorothiazide), goiter who presents for Follow-up.    Ms. Staton was last seen in January when she was started on atorvastatin 40mg for her HLD. She presented today for a FUV regarding the statin and to discuss her HTN. Initial BP was 156/78 but patient's home BP logs have been 100-120s/50-70s. She also reports muscle aches that pre-date atorvastatin initiation, which she attributes to doing excessive housework. Patient has had a dry cough and rhinorrhea for the last week, denies SOB, sore throat, fevers, or chills.     Subjective     Review of Systems   Constitutional: Negative.    HENT:  Positive for voice change.    Eyes: Negative.    Respiratory:  Positive for cough. Negative for shortness of breath.    Cardiovascular:  Negative for chest pain, palpitations and leg swelling.   Gastrointestinal: Negative.    Endocrine: Negative.    Genitourinary: Negative.    Musculoskeletal:  Positive for myalgias.   Skin: Negative.    Neurological: Negative.        Past Medical History:   Diagnosis Date    Contact with and (suspected) exposure to infections with a predominantly sexual mode of transmission 10/30/2014    Sexually transmitted disease exposure    Encounter for general adult medical examination without abnormal findings 06/10/2017    Well female exam without gynecological exam    Encounter for gynecological examination (general) (routine) without abnormal findings 06/08/2017    Encounter for routine gynecological examination    Encounter for immunization 10/30/2014    Need for immunization against influenza    Low back pain, unspecified 07/30/2019    Acute low back pain    Personal history of colonic polyps 06/22/2017    History of colon polyps    Personal history of colonic polyps     History of colonic polyps    Personal history of other diseases of the digestive system  "07/14/2017    History of constipation    Personal history of other medical treatment 07/06/2015    History of screening mammography    Personal history of other specified conditions 07/30/2019    History of dysuria    Pure hypercholesterolemia, unspecified 02/13/2014    High cholesterol    Xerosis cutis 06/08/2017    Dry skin     Past Surgical History:   Procedure Laterality Date    OTHER SURGICAL HISTORY  07/30/2019    Tubal ligation bilateral     No family history on file.  Patient has no known allergies.   Social History     Tobacco Use    Smoking status: Never    Smokeless tobacco: Never   Substance Use Topics    Alcohol use: Never       Current Outpatient Medications on File Prior to Visit   Medication Sig Dispense Refill    amLODIPine (Norvasc) 5 mg tablet Take 1 tablet (5 mg) by mouth once daily. 30 tablet 1    atorvastatin (Lipitor) 40 mg tablet Take 1 tablet (40 mg) by mouth once daily. 30 tablet 1    lisinopriL-hydrochlorothiazide 20-25 mg tablet Take 1 tablet by mouth once daily. 90 tablet 3    metFORMIN  mg 24 hr tablet Take 1 tablet (500 mg) by mouth 2 times a day. 180 tablet 3     No current facility-administered medications on file prior to visit.        Objective   Vitals: /78   Pulse 81   Temp 35.6 °C (96 °F) (Tympanic)   Ht 1.575 m (5' 2\")   Wt 63.7 kg (140 lb 8 oz)   SpO2 94%   BMI 25.70 kg/m²      Physical Exam  Vitals reviewed.   Constitutional:       Appearance: Normal appearance.   HENT:      Head: Normocephalic and atraumatic.      Right Ear: There is impacted cerumen.      Left Ear: Tympanic membrane, ear canal and external ear normal.      Nose: Rhinorrhea present.      Mouth/Throat:      Mouth: Mucous membranes are moist.   Eyes:      Extraocular Movements: Extraocular movements intact.   Neck:      Comments: goiter  Cardiovascular:      Rate and Rhythm: Normal rate and regular rhythm.      Heart sounds: Normal heart sounds.   Pulmonary:      Effort: Pulmonary effort is " normal.      Breath sounds: Normal breath sounds.   Abdominal:      General: Abdomen is flat.      Palpations: Abdomen is soft.   Musculoskeletal:         General: Normal range of motion.   Skin:     General: Skin is warm and dry.   Neurological:      General: No focal deficit present.      Mental Status: She is alert.   Psychiatric:         Mood and Affect: Mood normal.         Behavior: Behavior normal.         Assessment/Plan     Problem List Items Addressed This Visit       Hyperlipidemia    Relevant Medications    atorvastatin (Lipitor) 40 mg tablet     Other Visit Diagnoses       Encounter for immunization    -  Primary    Type 2 diabetes mellitus without complication, without long-term current use of insulin (CMS/[facility])        Relevant Medications    atorvastatin (Lipitor) 40 mg tablet          Lupis Staton is a 63 y.o. female with PMH of T2D (on metformin), HLD (on atorvastatin) , HTN (on amlodipine, lisinopril-hydrochlorothiazide), goiter who presents for Follow-up regarding statin initiation last visit and HTN management.    # HLD  - ASCVD risk 23.4%  - continue atorvastatin 40mg   - tolerating well, discussed increasing dosage but patient preferred to maintain her current dosage     # HTN  - initial /78, repeat /86  - at home BP logs 100-120s/50-70s   - continue amlodipine, lisinopril-hydrochlorothiazide    # health maintenance   - [id number] in 2019, given her hx of T2D, CDC recommends 1 dose of PCV20 at least 1 yr after PPSV23  - provided pt. With PCV20 today    Attending Supervision: seen and discussed with Dr. Bacon.    RTC in 3-6mo, or earlier as needed.    Shantell Cruz, M3    I, BILLY Seymour MD MS, was present and supervised the medical student during critical portions of the history and physical exam. I have discussed the assessment and plan with the medical student and agree with the documentation above with edits made in text.    BILLY Seymour MD MS  PGY-2 Family Medicine

## 2024-03-03 ASSESSMENT — ENCOUNTER SYMPTOMS
CONSTITUTIONAL NEGATIVE: 1
MYALGIAS: 1
NEUROLOGICAL NEGATIVE: 1
SHORTNESS OF BREATH: 0
EYES NEGATIVE: 1
ENDOCRINE NEGATIVE: 1
VOICE CHANGE: 1
COUGH: 1
PALPITATIONS: 0
GASTROINTESTINAL NEGATIVE: 1

## 2024-03-05 NOTE — PROGRESS NOTES
I saw and evaluated the patient. I personally obtained the key and critical portions of the history and physical exam or was physically present for key and critical portions performed by the resident. I reviewed the resident's documentation and discussed the patient with the resident. I agree with the resident's medical decision making as documented in the note.    Erich Bacon MD

## 2024-03-18 ENCOUNTER — TELEPHONE (OUTPATIENT)
Dept: PRIMARY CARE | Facility: CLINIC | Age: 64
End: 2024-03-18

## 2024-03-18 NOTE — TELEPHONE ENCOUNTER
Rx Refill Request Telephone Encounter    Name:  Lupis Staton  :  [id number]  Medication Name:  Amlodipine 5mg            Specific Pharmacy location:  [facility]   Date of last appointment:  2024  Date of next appointment:    Best number to reach patient:  [phone]

## 2024-03-19 DIAGNOSIS — I10 HYPERTENSION, UNSPECIFIED TYPE: ICD-10-CM

## 2024-03-19 RX ORDER — AMLODIPINE BESYLATE 5 MG/1
5 TABLET ORAL DAILY
Qty: 90 TABLET | Refills: 1 | Status: SHIPPED | OUTPATIENT
Start: 2024-03-19 | End: 2024-09-15

## 2024-05-27 ENCOUNTER — APPOINTMENT (OUTPATIENT)
Dept: RADIOLOGY | Facility: HOSPITAL | Age: 64
End: 2024-05-27
Payer: MEDICARE

## 2024-05-27 ENCOUNTER — HOSPITAL ENCOUNTER (EMERGENCY)
Facility: HOSPITAL | Age: 64
Discharge: HOME | End: 2024-05-27
Payer: MEDICARE

## 2024-05-27 VITALS
HEIGHT: 62 IN | DIASTOLIC BLOOD PRESSURE: 78 MMHG | SYSTOLIC BLOOD PRESSURE: 144 MMHG | WEIGHT: 135 LBS | BODY MASS INDEX: 24.84 KG/M2 | TEMPERATURE: 98.2 F | OXYGEN SATURATION: 98 % | HEART RATE: 90 BPM | RESPIRATION RATE: 17 BRPM
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
## 2024-05-27 DIAGNOSIS — M54.50 ACUTE MIDLINE LOW BACK PAIN WITHOUT SCIATICA: ICD-10-CM

## 2024-05-27 DIAGNOSIS — S09.90XA CLOSED HEAD INJURY, INITIAL ENCOUNTER: ICD-10-CM

## 2024-05-27 PROCEDURE — 99285 EMERGENCY DEPT VISIT HI MDM: CPT | Performed by: NURSE PRACTITIONER

## 2024-05-27 PROCEDURE — 70450 CT HEAD/BRAIN W/O DYE: CPT | Performed by: RADIOLOGY

## 2024-05-27 PROCEDURE — 72131 CT LUMBAR SPINE W/O DYE: CPT | Mod: FOREIGN READ | Performed by: RADIOLOGY

## 2024-05-27 PROCEDURE — 72125 CT NECK SPINE W/O DYE: CPT | Performed by: RADIOLOGY

## 2024-05-27 PROCEDURE — 70450 CT HEAD/BRAIN W/O DYE: CPT

## 2024-05-27 PROCEDURE — 72131 CT LUMBAR SPINE W/O DYE: CPT

## 2024-05-27 PROCEDURE — 99285 EMERGENCY DEPT VISIT HI MDM: CPT

## 2024-05-27 PROCEDURE — 72125 CT NECK SPINE W/O DYE: CPT

## 2024-05-27 RX ORDER — METHOCARBAMOL 500 MG/1
500 TABLET, FILM COATED ORAL EVERY 8 HOURS PRN
Qty: 15 TABLET | Refills: 0 | Status: SHIPPED | OUTPATIENT
Start: 2024-05-27 | End: 2024-06-01

## 2024-05-27 RX ORDER — ACETAMINOPHEN 325 MG/1
975 TABLET ORAL ONCE
Status: COMPLETED | OUTPATIENT
Start: 2024-05-27 | End: 2024-05-27

## 2024-05-27 RX ADMIN — ACETAMINOPHEN 975 MG: 325 TABLET ORAL at 15:45

## 2024-05-27 ASSESSMENT — PAIN - FUNCTIONAL ASSESSMENT
PAIN_FUNCTIONAL_ASSESSMENT: 0-10
PAIN_FUNCTIONAL_ASSESSMENT: 0-10

## 2024-05-27 ASSESSMENT — LIFESTYLE VARIABLES
HAVE YOU EVER FELT YOU SHOULD CUT DOWN ON YOUR DRINKING: NO
EVER FELT BAD OR GUILTY ABOUT YOUR DRINKING: NO
TOTAL SCORE: 0
EVER HAD A DRINK FIRST THING IN THE MORNING TO STEADY YOUR NERVES TO GET RID OF A HANGOVER: NO
HAVE PEOPLE ANNOYED YOU BY CRITICIZING YOUR DRINKING: NO

## 2024-05-27 ASSESSMENT — COLUMBIA-SUICIDE SEVERITY RATING SCALE - C-SSRS
1. IN THE PAST MONTH, HAVE YOU WISHED YOU WERE DEAD OR WISHED YOU COULD GO TO SLEEP AND NOT WAKE UP?: NO
6. HAVE YOU EVER DONE ANYTHING, STARTED TO DO ANYTHING, OR PREPARED TO DO ANYTHING TO END YOUR LIFE?: NO
2. HAVE YOU ACTUALLY HAD ANY THOUGHTS OF KILLING YOURSELF?: NO

## 2024-05-27 ASSESSMENT — PAIN SCALES - GENERAL
PAINLEVEL_OUTOF10: 3
PAINLEVEL_OUTOF10: 6

## 2024-05-27 ASSESSMENT — VISUAL ACUITY: OU: 1

## 2024-05-27 ASSESSMENT — PAIN DESCRIPTION - LOCATION: LOCATION: HEAD
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
## 2024-06-03 ENCOUNTER — HOSPITAL ENCOUNTER (EMERGENCY)
Facility: HOSPITAL | Age: 64
Discharge: HOME | End: 2024-06-03
Payer: COMMERCIAL

## 2024-06-03 ENCOUNTER — APPOINTMENT (OUTPATIENT)
Dept: RADIOLOGY | Facility: HOSPITAL | Age: 64
End: 2024-06-03
Payer: COMMERCIAL

## 2024-06-03 VITALS
TEMPERATURE: 97.9 F | BODY MASS INDEX: 24.84 KG/M2 | DIASTOLIC BLOOD PRESSURE: 70 MMHG | HEIGHT: 62 IN | WEIGHT: 135 LBS | OXYGEN SATURATION: 98 % | HEART RATE: 92 BPM | SYSTOLIC BLOOD PRESSURE: 106 MMHG | RESPIRATION RATE: 16 BRPM

## 2024-06-03 DIAGNOSIS — Z04.1 EXAM FOLLOWING MVC (MOTOR VEHICLE COLLISION), NO APPARENT INJURY: ICD-10-CM

## 2024-06-03 DIAGNOSIS — M25.552 PAIN OF LEFT HIP: Primary | ICD-10-CM

## 2024-06-03 PROCEDURE — 2500000004 HC RX 250 GENERAL PHARMACY W/ HCPCS (ALT 636 FOR OP/ED): Mod: SE | Performed by: PHYSICIAN ASSISTANT

## 2024-06-03 PROCEDURE — 2500000001 HC RX 250 WO HCPCS SELF ADMINISTERED DRUGS (ALT 637 FOR MEDICARE OP): Mod: SE | Performed by: PHYSICIAN ASSISTANT

## 2024-06-03 PROCEDURE — 99283 EMERGENCY DEPT VISIT LOW MDM: CPT | Mod: 25

## 2024-06-03 PROCEDURE — 73502 X-RAY EXAM HIP UNI 2-3 VIEWS: CPT | Mod: LEFT SIDE | Performed by: RADIOLOGY

## 2024-06-03 PROCEDURE — 99284 EMERGENCY DEPT VISIT MOD MDM: CPT | Performed by: PHYSICIAN ASSISTANT

## 2024-06-03 PROCEDURE — 96372 THER/PROPH/DIAG INJ SC/IM: CPT | Performed by: PHYSICIAN ASSISTANT

## 2024-06-03 PROCEDURE — 2500000005 HC RX 250 GENERAL PHARMACY W/O HCPCS: Mod: SE | Performed by: PHYSICIAN ASSISTANT

## 2024-06-03 PROCEDURE — 73502 X-RAY EXAM HIP UNI 2-3 VIEWS: CPT | Mod: LT

## 2024-06-03 RX ORDER — KETOROLAC TROMETHAMINE 30 MG/ML
30 INJECTION, SOLUTION INTRAMUSCULAR; INTRAVENOUS ONCE
Status: COMPLETED | OUTPATIENT
Start: 2024-06-03 | End: 2024-06-03

## 2024-06-03 RX ORDER — LIDOCAINE 560 MG/1
1 PATCH PERCUTANEOUS; TOPICAL; TRANSDERMAL ONCE
Status: DISCONTINUED | OUTPATIENT
Start: 2024-06-03 | End: 2024-06-03 | Stop reason: HOSPADM

## 2024-06-03 RX ORDER — ACETAMINOPHEN 325 MG/1
975 TABLET ORAL ONCE
Status: COMPLETED | OUTPATIENT
Start: 2024-06-03 | End: 2024-06-03

## 2024-06-03 RX ORDER — CYCLOBENZAPRINE HCL 10 MG
10 TABLET ORAL 3 TIMES DAILY PRN
Qty: 20 TABLET | Refills: 0 | Status: SHIPPED | OUTPATIENT
Start: 2024-06-03

## 2024-06-03 RX ORDER — CYCLOBENZAPRINE HCL 10 MG
10 TABLET ORAL ONCE
Status: COMPLETED | OUTPATIENT
Start: 2024-06-03 | End: 2024-06-03

## 2024-06-03 RX ORDER — KETOROLAC TROMETHAMINE 10 MG/1
10 TABLET, FILM COATED ORAL EVERY 6 HOURS PRN
Qty: 20 TABLET | Refills: 0 | Status: SHIPPED | OUTPATIENT
Start: 2024-06-03 | End: 2024-06-04

## 2024-06-03 RX ORDER — ACETAMINOPHEN 325 MG/1
650 TABLET ORAL EVERY 6 HOURS PRN
Qty: 30 TABLET | Refills: 0 | Status: SHIPPED | OUTPATIENT
Start: 2024-06-03

## 2024-06-03 RX ADMIN — CYCLOBENZAPRINE 10 MG: 10 TABLET, FILM COATED ORAL at 14:53

## 2024-06-03 RX ADMIN — ACETAMINOPHEN 975 MG: 325 TABLET ORAL at 14:53

## 2024-06-03 RX ADMIN — KETOROLAC TROMETHAMINE 30 MG: 30 INJECTION, SOLUTION INTRAMUSCULAR at 14:54

## 2024-06-03 RX ADMIN — LIDOCAINE 1 PATCH: 4 PATCH TOPICAL at 14:53

## 2024-06-03 ASSESSMENT — COLUMBIA-SUICIDE SEVERITY RATING SCALE - C-SSRS
2. HAVE YOU ACTUALLY HAD ANY THOUGHTS OF KILLING YOURSELF?: NO
6. HAVE YOU EVER DONE ANYTHING, STARTED TO DO ANYTHING, OR PREPARED TO DO ANYTHING TO END YOUR LIFE?: NO
1. IN THE PAST MONTH, HAVE YOU WISHED YOU WERE DEAD OR WISHED YOU COULD GO TO SLEEP AND NOT WAKE UP?: NO

## 2024-06-03 ASSESSMENT — PAIN - FUNCTIONAL ASSESSMENT: PAIN_FUNCTIONAL_ASSESSMENT: 0-10

## 2024-06-03 ASSESSMENT — PAIN SCALES - GENERAL: PAINLEVEL_OUTOF10: 9
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
## 2024-06-03 NOTE — Clinical Note
Lupis Staton was seen and treated in our emergency department on 6/3/2024.  She may return to work on 06/06/2024.       If you have any questions or concerns, please don't hesitate to call.      Bronson Cabrales PA-C

## 2024-06-03 NOTE — DISCHARGE INSTRUCTIONS
Your x-ray was unremarkable.  You may feel persistent pains in the coming days or week before you start to feel better.  Follow-up with your PCP, additionally they can refer you to PT/OT.  Get plenty of rest
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
## 2024-06-04 ENCOUNTER — OFFICE VISIT (OUTPATIENT)
Dept: PRIMARY CARE | Facility: CLINIC | Age: 64
End: 2024-06-04
Payer: MEDICARE

## 2024-06-04 VITALS
HEIGHT: 62 IN | HEART RATE: 85 BPM | OXYGEN SATURATION: 97 % | DIASTOLIC BLOOD PRESSURE: 75 MMHG | BODY MASS INDEX: 24.84 KG/M2 | WEIGHT: 135 LBS | SYSTOLIC BLOOD PRESSURE: 116 MMHG | TEMPERATURE: 96.7 F

## 2024-06-04 DIAGNOSIS — M25.552 PAIN OF LEFT HIP: Primary | ICD-10-CM

## 2024-06-04 RX ORDER — NAPROXEN 500 MG/1
TABLET ORAL 2 TIMES DAILY
COMMUNITY
Start: 2021-09-29 | End: 2024-06-04 | Stop reason: SDUPTHER

## 2024-06-04 RX ORDER — ROSUVASTATIN CALCIUM 20 MG/1
1 TABLET, COATED ORAL DAILY
COMMUNITY
Start: 2021-03-23

## 2024-06-04 RX ORDER — CLINDAMYCIN HYDROCHLORIDE 300 MG/1
CAPSULE ORAL
COMMUNITY
Start: 2022-11-29

## 2024-06-04 RX ORDER — SIMVASTATIN 40 MG/1
TABLET, FILM COATED ORAL
COMMUNITY

## 2024-06-04 RX ORDER — NAPROXEN 500 MG/1
500 TABLET ORAL
Qty: 28 TABLET | Refills: 0 | Status: SHIPPED | OUTPATIENT
Start: 2024-06-04 | End: 2024-06-18

## 2024-06-04 RX ORDER — SIMETHICONE 40MG/0.6ML
1 SUSPENSION, DROPS(FINAL DOSAGE FORM)(ML) ORAL DAILY
COMMUNITY
Start: 2021-11-19

## 2024-06-04 ASSESSMENT — PAIN SCALES - GENERAL: PAINLEVEL: 8
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
## 2024-06-10 NOTE — PROGRESS NOTES
I reviewed the resident/fellow's documentation and discussed the patient with the resident/fellow. I agree with the resident/fellow's medical decision making as documented in the note.     Bouchra Soto MD

## 2024-06-20 ENCOUNTER — APPOINTMENT (OUTPATIENT)
Dept: PRIMARY CARE | Facility: CLINIC | Age: 64
End: 2024-06-20
Payer: COMMERCIAL

## 2024-07-12 ENCOUNTER — APPOINTMENT (OUTPATIENT)
Dept: PRIMARY CARE | Facility: CLINIC | Age: 64
End: 2024-07-12
Payer: COMMERCIAL

## 2024-07-25 ENCOUNTER — APPOINTMENT (OUTPATIENT)
Dept: PRIMARY CARE | Facility: CLINIC | Age: 64
End: 2024-07-25
Payer: COMMERCIAL

## 2024-08-01 ENCOUNTER — EVALUATION (OUTPATIENT)
Dept: PHYSICAL THERAPY | Facility: HOSPITAL | Age: 64
End: 2024-08-01
Payer: COMMERCIAL

## 2024-08-01 DIAGNOSIS — M25.552 PAIN OF LEFT HIP: ICD-10-CM

## 2024-08-01 PROCEDURE — 97110 THERAPEUTIC EXERCISES: CPT | Mod: GP | Performed by: PHYSICAL THERAPIST

## 2024-08-01 PROCEDURE — 97161 PT EVAL LOW COMPLEX 20 MIN: CPT | Mod: GP | Performed by: PHYSICAL THERAPIST

## 2024-08-01 ASSESSMENT — ENCOUNTER SYMPTOMS
DEPRESSION: 1
LOSS OF SENSATION IN FEET: 1
OCCASIONAL FEELINGS OF UNSTEADINESS: 0
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
## 2024-08-15 ENCOUNTER — TREATMENT (OUTPATIENT)
Dept: PHYSICAL THERAPY | Facility: HOSPITAL | Age: 64
End: 2024-08-15
Payer: COMMERCIAL

## 2024-08-15 DIAGNOSIS — M25.552 PAIN OF LEFT HIP: ICD-10-CM

## 2024-08-15 PROCEDURE — 97110 THERAPEUTIC EXERCISES: CPT | Mod: GP,CQ

## 2024-08-15 PROCEDURE — 97140 MANUAL THERAPY 1/> REGIONS: CPT | Mod: GP,CQ
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
## 2024-08-19 ENCOUNTER — TREATMENT (OUTPATIENT)
Dept: PHYSICAL THERAPY | Facility: HOSPITAL | Age: 64
End: 2024-08-19
Payer: COMMERCIAL

## 2024-08-19 DIAGNOSIS — M25.552 PAIN OF LEFT HIP: ICD-10-CM

## 2024-08-19 PROCEDURE — 97110 THERAPEUTIC EXERCISES: CPT | Mod: GP,CQ

## 2024-08-19 PROCEDURE — 97140 MANUAL THERAPY 1/> REGIONS: CPT | Mod: GP,CQ

## 2024-08-19 NOTE — PROGRESS NOTES
Physical Therapy Treatment    Patient Name:Lupis Staton  MRN:[id number]  Today's Date:8/19/2024  Referred by: Bouchra Soto  Time Calculation  Start Time: 1223  Stop Time: 1308  Time Calculation (min): 45 min    Therapy Diagnosis  Problem List Items Addressed This Visit    None  Visit Diagnoses         Codes    Pain of left hip     M25.552            Assessment:    Pt tolerated session fairly well this date. Pt again with muscle guarding initially during manual stretching and limited ROM in R>LLE this date. As session duration elapse following therex and manual techniques pt with improved posture and ROM of BLEs and lumbar region. Pt with muscular cramping in L>R hamstring during manual stretching and sciatic nerve flossing.      Plan:   Continue BLE stretching, darin/prone and initiate cat/cow and potentially bird/dog     Insurance  Visit number: 3  Approved number of visits: 19  Onset Date: 6/4/2024      Subjective/Pain: Pt with c/o B knee pain, L>R hip and low back pain. Pt continues to be severely limited with ADLS and IADLS. She has been performing the stretches/HEP at home, but feels like she can't quite get the same ROM as when she is in therapy.    Current Pain Level (0-10): 8    HEP Compliance: Good    Objective/Outcome Measures:  Muscle guarding initially      Treatment  Therapeutic Procedure PT Therapeutic Procedures Time Entry  Manual Therapy Time Entry: 10  Therapeutic Exercise Time Entry: 35 minutes    Therapeutic Exercise  35 minutes  10 mins scifit recumbent stepper lvl 2  Sciatic nerve flossing x 2 mins ea LE  2 mins B Heel slides + nerve glides on swiss ball  2 mins LTR on swiss ball  3 x 30s DKTC stretch  Darin pose 2 x 1 min  Prone on elbows 2 x 1 min    Not today  Standing Hip flexor stretch 2 x 1min  Standing HS stretch 2 x 1 min    Manual Therapy 10 minutes  Manual BLE stretching; B HS, B Piriformis 2 x 30s holds  Manual Contract/relax stretch B HS x 3  Tactile cues throughout therex for  proper technique/ROM    Not today  Manual BLE stretching; B HS, B SKTC, B Piriformis and DKTC 3 x 30s holds      Goals:  Lower Extremity Goals: By discharge, patient will:  1) Demonstrate independence with home exercise program for overall symptom management  2) Increase overall exercise tolerance without adverse reaction or increased chief complaint  3) Increase ROM of the L hip to WFL and pain free in order to improve the ability to perform essential ADLs  4) Increase strength of the bilateral LE to 4+/5 in order to improve the ability to perform essential ADLs  5) Report decrease in pain by >= 2 points to meet MCID  6) Increase score of LEFS by > 9 points to meet the MCID  7) Ascend and descend 1 flight of stairs reciprocally and safely without an increase in symptoms  8) Be able to perform the ADL of squatting to pick objects up from the floor without an increase or production of symptoms

## 2024-08-27 ENCOUNTER — APPOINTMENT (OUTPATIENT)
Dept: OPHTHALMOLOGY | Facility: CLINIC | Age: 64
End: 2024-08-27
Payer: COMMERCIAL

## 2024-08-27 ENCOUNTER — TREATMENT (OUTPATIENT)
Dept: PHYSICAL THERAPY | Facility: HOSPITAL | Age: 64
End: 2024-08-27
Payer: COMMERCIAL

## 2024-08-27 DIAGNOSIS — H52.4 PRESBYOPIA: Primary | ICD-10-CM

## 2024-08-27 DIAGNOSIS — H25.13 NUCLEAR SCLEROTIC CATARACT OF BOTH EYES: ICD-10-CM

## 2024-08-27 DIAGNOSIS — E11.9 TYPE 2 DIABETES MELLITUS WITHOUT COMPLICATION, WITHOUT LONG-TERM CURRENT USE OF INSULIN (MULTI): ICD-10-CM

## 2024-08-27 DIAGNOSIS — M25.552 PAIN OF LEFT HIP: ICD-10-CM

## 2024-08-27 PROBLEM — Z86.010 HISTORY OF COLONIC POLYPS: Status: ACTIVE | Noted: 2024-08-27

## 2024-08-27 PROBLEM — Z86.0100 HISTORY OF COLONIC POLYPS: Status: ACTIVE | Noted: 2024-08-27

## 2024-08-27 PROCEDURE — 97110 THERAPEUTIC EXERCISES: CPT | Mod: GP,CQ

## 2024-08-27 PROCEDURE — 92015 DETERMINE REFRACTIVE STATE: CPT | Performed by: OPTOMETRIST

## 2024-08-27 PROCEDURE — 97140 MANUAL THERAPY 1/> REGIONS: CPT | Mod: GP,CQ

## 2024-08-27 PROCEDURE — 92014 COMPRE OPH EXAM EST PT 1/>: CPT | Performed by: OPTOMETRIST

## 2024-08-27 ASSESSMENT — ENCOUNTER SYMPTOMS
CONSTITUTIONAL NEGATIVE: 0
EYES NEGATIVE: 0
MUSCULOSKELETAL NEGATIVE: 0
PSYCHIATRIC NEGATIVE: 0
ENDOCRINE NEGATIVE: 0
HEMATOLOGIC/LYMPHATIC NEGATIVE: 0
ALLERGIC/IMMUNOLOGIC NEGATIVE: 0
CARDIOVASCULAR NEGATIVE: 0
GASTROINTESTINAL NEGATIVE: 0
RESPIRATORY NEGATIVE: 0
NEUROLOGICAL NEGATIVE: 0

## 2024-08-27 ASSESSMENT — REFRACTION_MANIFEST
OD_ADD: +2.25
OS_ADD: +2.25
OD_AXIS: 085
OD_SPHERE: +0.50
OS_AXIS: 0.13
OS_SPHERE: PLANO
OD_CYLINDER: -0.50
OS_CYLINDER: -0.50

## 2024-08-27 ASSESSMENT — REFRACTION
OD_ADD: +2.25
OS_CYLINDER: -0.50
OD_SPHERE: +0.50
OS_AXIS: 0.13
OD_CYLINDER: -0.50
OS_ADD: +2.25
OS_SPHERE: PLANO
OD_AXIS: 085

## 2024-08-27 ASSESSMENT — CUP TO DISC RATIO
OS_RATIO: .35
OD_RATIO: .3

## 2024-08-27 ASSESSMENT — VISUAL ACUITY
OD_SC+: -2
OS_SC+: -1
OD_SC: 20/20
METHOD: SNELLEN - LINEAR
OS_SC: 20/20

## 2024-08-27 ASSESSMENT — CONF VISUAL FIELD
OD_INFERIOR_NASAL_RESTRICTION: 0
OS_SUPERIOR_TEMPORAL_RESTRICTION: 0
OS_INFERIOR_TEMPORAL_RESTRICTION: 0
OS_INFERIOR_NASAL_RESTRICTION: 0
OD_INFERIOR_TEMPORAL_RESTRICTION: 0
OD_SUPERIOR_NASAL_RESTRICTION: 0
OD_SUPERIOR_TEMPORAL_RESTRICTION: 0
OS_NORMAL: 1
METHOD: COUNTING FINGERS
OD_NORMAL: 1
OS_SUPERIOR_NASAL_RESTRICTION: 0

## 2024-08-27 ASSESSMENT — SLIT LAMP EXAM - LIDS
COMMENTS: NORMAL
COMMENTS: NORMAL

## 2024-08-27 ASSESSMENT — EXTERNAL EXAM - RIGHT EYE: OD_EXAM: NORMAL

## 2024-08-27 ASSESSMENT — TONOMETRY: IOP_UNABLETOASSESS: 1

## 2024-08-27 ASSESSMENT — EXTERNAL EXAM - LEFT EYE: OS_EXAM: NORMAL

## 2024-08-27 NOTE — PROGRESS NOTES
Assessment/Plan   Diagnoses and all orders for this visit:  Presbyopia  Spec Rx released. Optional to fill as patient not currently having any difficulty. Ok to use OTC readers as desired. Ocular health WNL for age OU. Monitor 1 year. Refraction billed today.    Nuclear sclerotic cataract of both eyes  Patient's cataracts are very minor and  not visually significant. Will monitor for changes. No indication for surgery at this time.    Type 2 diabetes mellitus without complication, without long-term current use of insulin (Multi)  The patient has diabetes without any evidence of retinopathy.  The patient was advised to maintain tight glucose control, tight blood pressure control, and favorable levels of cholesterol, low density lipoprotein, and high density lipoproteins.  Follow up in one year was recommended.

## 2024-08-27 NOTE — PROGRESS NOTES
Physical Therapy Treatment    Patient Name:Lupis Staton  MRN:[id number]  Today's Date:8/27/2024  Referred by: Bouchra Soto  Time Calculation  Start Time: 1031  Stop Time: 1114  Time Calculation (min): 43 min    Therapy Diagnosis  Problem List Items Addressed This Visit    None  Visit Diagnoses         Codes    Pain of left hip     M25.552            Assessment:     Pt tolerated session fairly well this date. Pt with reduced muscle guarding initially during manual stretching. Pt with limited ROM in L>RLE this date. As session duration elapse following therex and manual techniques pt with improved posture and ROM of BLEs and lumbar region. Pt needing inc time to perform all exercises      Plan:    Continue BLE stretching and jurgen/prone, cat/cow and potentially bird/dog. Assess pt's pain, review goals.    Insurance  Visit number: 4  Approved number of visits: 19  Onset Date: 6/4/2024    Subjective/Pain: Pt with most of her pain in her L hip and low back region. She notes her knees have been feeling okay as of late.  Current Pain Level (0-10): 7      HEP Compliance: Good    Objective/Outcome Measures:  Pt with continued BLE and lumbar stiffness/tightness  Pt needing vc/tc for all therex and with slow overall phuong    Treatment  Therapeutic Procedure PT Therapeutic Procedures Time Entry  Manual Therapy Time Entry: 16  Therapeutic Exercise Time Entry: 27 minutes    Therapeutic Exercise  27 minutes  8 scifit recumbent stepper lvl 2   -MHP under low back-  2 mins B Heel slides + nerve glides on swiss ball  2 mins LTR on swiss ball  2 mins MIP on swiss ball  Jurgen pose 2 x 1 min  Prone on elbows 2 x 1 min  Cat/cow x 2 mins  Modified Bird/dog; Alt UE, Alt LE x 10 ea  Bird/Dog 2 x 10  Seated piriformis 2 x 30s      Manual Therapy 16 minutes  Manual BLE stretching; B HS, B Piriformis 2 x 30s holds  Manual Contract/relax stretch B HS x 3 and L Piriformis x 2  Tactile cues throughout therex for proper technique/ROM      OP  EDUCATION:       Goals:  Lower Extremity Goals: By discharge, patient will:  1) Demonstrate independence with home exercise program for overall symptom management  2) Increase overall exercise tolerance without adverse reaction or increased chief complaint  3) Increase ROM of the L hip to WFL and pain free in order to improve the ability to perform essential ADLs  4) Increase strength of the bilateral LE to 4+/5 in order to improve the ability to perform essential ADLs  5) Report decrease in pain by >= 2 points to meet MCID  6) Increase score of LEFS by > 9 points to meet the MCID  7) Ascend and descend 1 flight of stairs reciprocally and safely without an increase in symptoms  8) Be able to perform the ADL of squatting to pick objects up from the floor without an increase or production of symptoms

## 2024-09-05 ENCOUNTER — TREATMENT (OUTPATIENT)
Dept: PHYSICAL THERAPY | Facility: HOSPITAL | Age: 64
End: 2024-09-05
Payer: MEDICAID

## 2024-09-05 DIAGNOSIS — M25.552 PAIN OF LEFT HIP: ICD-10-CM

## 2024-09-05 PROCEDURE — 97110 THERAPEUTIC EXERCISES: CPT | Mod: GP,CQ

## 2024-09-05 PROCEDURE — 97140 MANUAL THERAPY 1/> REGIONS: CPT | Mod: GP,CQ

## 2024-09-11 ENCOUNTER — TREATMENT (OUTPATIENT)
Dept: PHYSICAL THERAPY | Facility: HOSPITAL | Age: 64
End: 2024-09-11
Payer: MEDICAID

## 2024-09-11 DIAGNOSIS — M25.552 PAIN OF LEFT HIP: ICD-10-CM

## 2024-09-11 PROCEDURE — 97140 MANUAL THERAPY 1/> REGIONS: CPT | Mod: GP,CQ

## 2024-09-11 PROCEDURE — 97110 THERAPEUTIC EXERCISES: CPT | Mod: GP,CQ

## 2024-09-11 NOTE — PROGRESS NOTES
Physical Therapy Treatment    Patient Name:Lupis Staton  MRN:[id number]  Today's Date:9/11/2024  Referred by: Bouchra Soto  Time Calculation  Start Time: 0911  Stop Time: 0953  Time Calculation (min): 42 min    Therapy Diagnosis  Problem List Items Addressed This Visit    None  Visit Diagnoses         Codes    Pain of left hip     M25.552            Assessment:   Pt tolerated session well. Despite pt's c/o pain and tightness, has noticeable improvements with BLE ROM. Pt tolerated all of today's treatment without inc in pain    Plan:    Continue BLE stretching, resistance band hip program and initiate perform light weight cybex or seated/standing therex to improve BLE ROM with active ROM     Insurance  Visit number: 6  Approved number of visits: 30  Onset Date: 6/4/2024  Certification Period Start Date:8/1/24  Certification Period End Date:  11/30/24    Subjective/Pain: Pt with pain and stiffness in lumbar region going down into L>R glute/hip and ending at B knees.   Current Pain Level (0-10): 5      HEP Compliance: Good    Objective/Outcome Measures:  Demo required for all therex  Inc ROM noted with manual stretching and therex  Education Documentation  No documentation found.  Education Comments  No comments found.        Treatment  Therapeutic Procedure PT Therapeutic Procedures Time Entry  Manual Therapy Time Entry: 12  Therapeutic Exercise Time Entry: 30 minutes    Therapeutic Exercise 30 minutes  8mins  scifit recumbent stepper lvl 3  B heel slides/sciatic glides on swiss ball x 4 mins  LTR on swiss ball x 4 mins  Hip add iso 10x 10s holds into bolster  Hip abd 3 x 10, yellow band  Marches 3 x 10, yellow band  Reverse clam shells x 30 yellow band  Updated HEP, review, issuing band    Manual Therapy 12 minutes  PROM stretching BLEs; B HS,B Piriformis, DKTC and B hip adductors 2 x 30s holds ea  PROM B hip abd/add, slow controlled motions x 10  Contract/relax L HS and piriformis x 3      OP EDUCATION:   Access  Code: [email or web address]  URL: [email or web address]  Date: 09/11/2024  Prepared by: Arden Beasley    Exercises  - Hooklying Clamshell with Resistance  - 1 x daily - 7 x weekly - 3 sets - 10 reps  - Supine Bridge with Resistance Band  - 1 x daily - 7 x weekly - 2 sets - 10 reps - 2s hold  - Supine Hip Adduction Isometric with Ball  - 1 x daily - 7 x weekly - 1 sets - 10 reps - 10s hold  - Clam with Resistance  - 1 x daily - 7 x weekly - 3 sets - 10 reps  - Sidelying Reverse Clamshell with Resistance  - 1 x daily - 7 x weekly - 3 sets - 10 reps  - Supine Hip Flexion with Resistance Loop  - 1 x daily - 7 x weekly - 3 sets - 10 reps      Goals:  Lower Extremity Goals: By discharge, patient will:  1) Demonstrate independence with home exercise program for overall symptom management  2) Increase overall exercise tolerance without adverse reaction or increased chief complaint  3) Increase ROM of the L hip to WFL and pain free in order to improve the ability to perform essential ADLs  4) Increase strength of the bilateral LE to 4+/5 in order to improve the ability to perform essential ADLs  5) Report decrease in pain by >= 2 points to meet MCID  6) Increase score of LEFS by > 9 points to meet the MCID  7) Ascend and descend 1 flight of stairs reciprocally and safely without an increase in symptoms  8) Be able to perform the ADL of squatting to pick objects up from the floor without an increase or production of symptoms

## 2024-09-17 ENCOUNTER — TREATMENT (OUTPATIENT)
Dept: PHYSICAL THERAPY | Facility: HOSPITAL | Age: 64
End: 2024-09-17
Payer: MEDICAID

## 2024-09-17 DIAGNOSIS — M25.552 PAIN OF LEFT HIP: ICD-10-CM

## 2024-09-17 PROCEDURE — 97110 THERAPEUTIC EXERCISES: CPT | Mod: GP,CQ

## 2024-09-17 NOTE — PROGRESS NOTES
"Physical Therapy Treatment    Patient Name:Lupis Staton  MRN:[id number]  Today's Date:9/17/2024  Referred by: Bouchra Soto  Time Calculation  Start Time: 0905  Stop Time: 0948  Time Calculation (min): 43 min    Therapy Diagnosis  Problem List Items Addressed This Visit    None  Visit Diagnoses         Codes    Pain of left hip     M25.552            Assessment:   Pt tolerated all standing and seated resistance exercises well this date. Pt initially with dec ROM for initial reps of therex, improving as reps and sets inc. Pt needing demo and min cues for proper technique and posture throughout. Pt reports feeling \"loose\" following light weight high rep therex routine. Pt ambulating around facility post therex with normalization of gait compared to upon entrance to clinic.    Plan:    Continue BLE stretching, resistance band hip program and continue to perform light weight cybex or standing therex to improve BLE ROM with active ROM and core/abdominal    Insurance  Visit number: 7  Approved number of visits: 30  Onset Date: 6/4/2024      Subjective/Pain: Pt reports pain still lingers in L hip wrapping around and into the L knee. She did perform HEP without issues and felt \"loose\" following.   Current Pain Level (0-10): 7      HEP Compliance: Fair    Objective/Outcome Measures:  Kyphotic posture and antalgic gait upon entrance  Demo and min cues needed for all therex  Hip ROM WNL throughout all therex    Treatment  Therapeutic Procedure PT Therapeutic Procedures Time Entry  Therapeutic Exercise Time Entry: 43 minutes    Therapeutic Exercise 43 minutes  8mins  scifit recumbent stepper lvl 3   Cybex knee flex 20# x 5, 15# x 30  Cybex knee ext 10# x 30  TG DL Squat x 45, blue cord  Steam boats yellow band x 20 ea LE- flex, abd, ext  Walking around therapy gym, improved posture, BLE ROM observed.     Modalities   Vasopneumatic Device       minutes  Electrical Stimulation          minutes  Ultrasound                      " minutes  Iontophoresis                     minutes  Cold Pack                        minutes  Mechanical Traction           minutes    OP EDUCATION:       Goals:  Lower Extremity Goals: By discharge, patient will:  1) Demonstrate independence with home exercise program for overall symptom management  2) Increase overall exercise tolerance without adverse reaction or increased chief complaint  3) Increase ROM of the L hip to WFL and pain free in order to improve the ability to perform essential ADLs  4) Increase strength of the bilateral LE to 4+/5 in order to improve the ability to perform essential ADLs  5) Report decrease in pain by >= 2 points to meet MCID  6) Increase score of LEFS by > 9 points to meet the MCID  7) Ascend and descend 1 flight of stairs reciprocally and safely without an increase in symptoms  8) Be able to perform the ADL of squatting to pick objects up from the floor without an increase or production of symptoms

## 2024-09-24 ENCOUNTER — TREATMENT (OUTPATIENT)
Dept: PHYSICAL THERAPY | Facility: HOSPITAL | Age: 64
End: 2024-09-24
Payer: MEDICAID

## 2024-09-24 DIAGNOSIS — M25.552 PAIN OF LEFT HIP: ICD-10-CM

## 2024-09-24 PROCEDURE — 97110 THERAPEUTIC EXERCISES: CPT | Mod: GP,CQ

## 2024-09-24 NOTE — PROGRESS NOTES
Physical Therapy Treatment    Patient Name:Lupis Staton  MRN:[id number]  Today's Date:9/24/2024  Referred by: Bouchra Soto  Time Calculation  Start Time: 0901  Stop Time: 0956  Time Calculation (min): 55 min    Therapy Diagnosis  Problem List Items Addressed This Visit    None  Visit Diagnoses         Codes    Pain of left hip     M25.552            Assessment:   Pt tolerated session well this date. Pt with R>L hip flexor strength noted with cable column standing hip therex. Pt with improvements with BLE strength and ROM since initial eval. Pt has reached 8 visits and may potentially be discharged from therapy next session.    Plan:    Potential DC next visit    Insurance  Visit number: 8  Approved number of visits: 30  Onset Date: 6/4/2024  Auth Start Date: 8/1/24  Auth End Date:  11/30/24      Subjective/Pain:Pt absent of c/o pain this date. Pt notes she feels good and is able to walk around more. She does continue to have L knee pain at times with excessive squatting/bending.  Current Pain Level (0-10): 0    HEP Compliance: Good    Objective/Outcome Measures:  LEFS- 77/80 96.3%    Treatment  Therapeutic Procedure PT Therapeutic Procedures Time Entry  Therapeutic Exercise Time Entry: 55 minutes    Therapeutic Exercise  55 minutes  8 mins recumbent bicycle lvl 2  Sumo Squat + swiss ball pick-up x 15  Sumo squat + 20# physioball pick-up x 5  Cybex knee flex 30# x 30  Cybex knee ext 20# x 30  TG DL Squat x 45, blue cord  5# cable column standing hip flex and abd x 20 ea, ea LE  LEFS assessment    Not today  Steam boats yellow band x 20 ea LE- flex, abd, ext    OP EDUCATION:       Goals:  Lower Extremity Goals  Lower Extremity Goals: By discharge, patient will:  1) Demonstrate independence with home exercise program for overall symptom management  2) Increase overall exercise tolerance without adverse reaction or increased chief complaint  3) Increase ROM of the L hip to WFL and pain free in order to improve the  ability to perform essential ADLs  4) Increase strength of the bilateral LE to 4+/5 in order to improve the ability to perform essential ADLs  5) Report decrease in pain by >= 2 points to meet MCID  6) Increase score of LEFS by > 9 points to meet the MCID  7) Ascend and descend 1 flight of stairs reciprocally and safely without an increase in symptoms  8) Be able to perform the ADL of squatting to pick objects up from the floor without an increase or production of symptoms

## 2024-09-25 DIAGNOSIS — I10 HYPERTENSION, UNSPECIFIED TYPE: ICD-10-CM

## 2024-09-25 RX ORDER — AMLODIPINE BESYLATE 5 MG/1
5 TABLET ORAL DAILY
Qty: 30 TABLET | Refills: 0 | Status: CANCELLED | OUTPATIENT
Start: 2024-09-25 | End: 2024-10-25

## 2024-09-27 ENCOUNTER — OFFICE VISIT (OUTPATIENT)
Dept: PRIMARY CARE | Facility: CLINIC | Age: 64
End: 2024-09-27
Payer: MEDICAID

## 2024-09-27 VITALS
SYSTOLIC BLOOD PRESSURE: 130 MMHG | TEMPERATURE: 96.5 F | HEART RATE: 76 BPM | WEIGHT: 140.5 LBS | HEIGHT: 62 IN | OXYGEN SATURATION: 97 % | BODY MASS INDEX: 25.86 KG/M2 | DIASTOLIC BLOOD PRESSURE: 79 MMHG

## 2024-09-27 DIAGNOSIS — E11.9 DIABETES MELLITUS TYPE 2 WITHOUT RETINOPATHY (MULTI): Primary | ICD-10-CM

## 2024-09-27 DIAGNOSIS — I10 HYPERTENSION, UNSPECIFIED TYPE: ICD-10-CM

## 2024-09-27 DIAGNOSIS — M25.552 PAIN OF LEFT HIP: ICD-10-CM

## 2024-09-27 DIAGNOSIS — M54.50 ACUTE MIDLINE LOW BACK PAIN WITHOUT SCIATICA: ICD-10-CM

## 2024-09-27 PROBLEM — R10.2 PELVIC PAIN IN FEMALE: Status: ACTIVE | Noted: 2024-09-27

## 2024-09-27 PROBLEM — R09.82 POSTNASAL DRIP: Status: ACTIVE | Noted: 2024-09-27

## 2024-09-27 PROBLEM — H92.09 PAIN OF EAR STRUCTURE: Status: ACTIVE | Noted: 2024-09-27

## 2024-09-27 PROBLEM — D12.6 TUBULAR ADENOMA OF COLON: Status: ACTIVE | Noted: 2024-09-27

## 2024-09-27 PROBLEM — S39.91XA INJURY OF ABDOMINAL WALL: Status: ACTIVE | Noted: 2024-09-27

## 2024-09-27 PROBLEM — R92.8 ABNORMAL MAMMOGRAM: Status: ACTIVE | Noted: 2024-09-27

## 2024-09-27 PROBLEM — D12.6 TUBULOVILLOUS ADENOMA OF COLON: Status: ACTIVE | Noted: 2024-09-27

## 2024-09-27 PROBLEM — H52.4 HYPEROPIA WITH PRESBYOPIA OF BOTH EYES: Status: ACTIVE | Noted: 2021-12-30

## 2024-09-27 PROBLEM — N39.0 ACUTE LOWER UTI (URINARY TRACT INFECTION): Status: ACTIVE | Noted: 2024-09-27

## 2024-09-27 PROBLEM — N94.9 VAGINAL DISCOMFORT: Status: ACTIVE | Noted: 2024-09-27

## 2024-09-27 PROBLEM — H52.03 HYPEROPIA WITH PRESBYOPIA OF BOTH EYES: Status: ACTIVE | Noted: 2021-12-30

## 2024-09-27 PROBLEM — F41.9 ANXIETY: Status: ACTIVE | Noted: 2024-09-27

## 2024-09-27 PROBLEM — H66.90 OTITIS MEDIA: Status: ACTIVE | Noted: 2024-09-27

## 2024-09-27 PROBLEM — R43.2 LOSS OF TASTE: Status: ACTIVE | Noted: 2024-09-27

## 2024-09-27 PROBLEM — N95.2 VAGINAL ATROPHY: Status: ACTIVE | Noted: 2024-09-27

## 2024-09-27 RX ORDER — AMLODIPINE BESYLATE 5 MG/1
5 TABLET ORAL DAILY
Qty: 90 TABLET | Refills: 1 | Status: SHIPPED | OUTPATIENT
Start: 2024-09-27 | End: 2025-03-26

## 2024-09-27 RX ORDER — CYCLOBENZAPRINE HCL 10 MG
10 TABLET ORAL 3 TIMES DAILY PRN
Qty: 20 TABLET | Refills: 0 | Status: SHIPPED | OUTPATIENT
Start: 2024-09-27

## 2024-09-27 ASSESSMENT — PATIENT HEALTH QUESTIONNAIRE - PHQ9
1. LITTLE INTEREST OR PLEASURE IN DOING THINGS: NOT AT ALL
2. FEELING DOWN, DEPRESSED OR HOPELESS: NOT AT ALL
SUM OF ALL RESPONSES TO PHQ9 QUESTIONS 1 AND 2: 0

## 2024-09-27 ASSESSMENT — ENCOUNTER SYMPTOMS
DEPRESSION: 0
LOSS OF SENSATION IN FEET: 0
OCCASIONAL FEELINGS OF UNSTEADINESS: 0

## 2024-09-27 ASSESSMENT — PAIN SCALES - GENERAL: PAINLEVEL: 0-NO PAIN

## 2024-09-27 NOTE — PATIENT INSTRUCTIONS
It was so great to see you today Lupis Staton  . Today we discussed:    - Confirm what medications you are taking at home and call clinic back to refill  - Refilled Flexeril, STOP Robaxin  - ordered updated A1c lab work  - Follow up for Well Adult exam in January    Call [phone]  For Care if you need to schedule appointment with specialist or images.  IF any labs were ordered today, I will CALL if labs are ABNORMAL. If labs are NORMAL then I will comment on MyChart and mail results to you.    Follow up in       You were see by:    Dr. Lorna Mariscal D.O.  [facility]   Phone: [phone]                 Maksim Ballard is a 6y6m M from [facility] w/ PMH tracheostomy on trach collar (home SIMV PEEP 6, PS 10, rate 15, fiO2 30%), epileptic seizures, PFO, cleft palate, hydrocephalus, HIE, b/l hearing loss, GT dependence, here for 1 wk of increased WOB, hypoxia, dyspnea requiring increased fiO2 100% and PEEP 10.     ED course: BTBx2, NS bolus, BCx, trach collar Cx, CXR, vanc, cefepime, m1VF, CBC, CMP, CXR, decadron 0.6 mg (3/18 at 6:17pm).
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
## 2024-09-28 PROBLEM — N39.0 ACUTE LOWER UTI (URINARY TRACT INFECTION): Status: RESOLVED | Noted: 2024-09-27 | Resolved: 2024-09-28

## 2024-09-28 PROBLEM — H66.90 OTITIS MEDIA: Status: RESOLVED | Noted: 2024-09-27 | Resolved: 2024-09-28

## 2024-09-28 PROBLEM — K59.00 CONSTIPATION: Status: RESOLVED | Noted: 2024-01-15 | Resolved: 2024-09-28

## 2024-09-28 ASSESSMENT — ENCOUNTER SYMPTOMS
RESPIRATORY NEGATIVE: 1
GASTROINTESTINAL NEGATIVE: 1
CARDIOVASCULAR NEGATIVE: 1
EYES NEGATIVE: 1
CONSTITUTIONAL NEGATIVE: 1

## 2024-10-02 ENCOUNTER — TREATMENT (OUTPATIENT)
Dept: PHYSICAL THERAPY | Facility: HOSPITAL | Age: 64
End: 2024-10-02
Payer: MEDICARE

## 2024-10-02 DIAGNOSIS — M25.552 PAIN OF LEFT HIP: ICD-10-CM

## 2024-10-02 PROCEDURE — 97110 THERAPEUTIC EXERCISES: CPT | Mod: GP,CQ

## 2024-10-02 NOTE — PROGRESS NOTES
Physical Therapy Treatment    Patient Name:Lupis Staton  MRN:[id number]  Today's Date:10/2/2024  Referred by: Bouchra Soto  Time Calculation  Start Time: 0905  Stop Time: 0948  Time Calculation (min): 43 min    Therapy Diagnosis  Problem List Items Addressed This Visit    None  Visit Diagnoses         Codes    Pain of left hip     M25.552            Assessment:   Pt tolerated treatment fairly well. Pt with R hamstring cramp while walking to next exercises following cybex knee flex machine, likely d/t inc in resistance and then change in ARC of motion. Pt educated on intake of plently of fluids, resting between sets and stretching as needed. Pt also educated on continued HEP performance with demo and performance of therex.    Plan:    Progress report, Potential DC next visit     Insurance  Visit number: 9  Approved number of visits: 30  Onset Date: 6/4/2024  Auth Start Date: 8/1/24  Auth End Date:  11/30/24    Subjective/Pain:  Pt reports her R>L LE having some tightness this morning. She states going from standing<>sitting she will hear some clicks/pops, but otherwise she continues to do well.   Current Pain Level (0-10): 1    HEP Compliance: Good    Objective/Outcome Measures:  R HS cramp post cybex knee flex      Treatment  Therapeutic Procedure PT Therapeutic Procedures Time Entry  Therapeutic Exercise Time Entry: 43 minutes    Therapeutic [id number] minutes  8 mins recumbent bicycle lvl 5  Cybex knee flex 40# x 30, limited ROM  Cybex knee flex 30# x 10, lowered ARC from ~50°-90°  Standing and seated R Hamstring stretch 5 x 30s holds  Cybex knee ext 30# 5 x 5  TG DL Squat 4 x 10 yellow + blue cord   Standing MIP on Bosu to step x 30  Wall squat x 5, education on performance at home  Standing hip flexor stretch at chair 2 x 30s holds ea    Manual Therapy   minutes    Neuromuscular Re-ed   minutes    Gait Training   minutes    Modalities   Vasopneumatic Device       minutes  Electrical Stimulation             minutes  Ultrasound                      minutes  Iontophoresis                     minutes  Cold Pack                        minutes  Mechanical Traction           minutes    OP EDUCATION:       Goals:  Lower Extremity Goals  Lower Extremity Goals: By discharge, patient will:  1) Demonstrate independence with home exercise program for overall symptom management  2) Increase overall exercise tolerance without adverse reaction or increased chief complaint  3) Increase ROM of the L hip to WFL and pain free in order to improve the ability to perform essential ADLs  4) Increase strength of the bilateral LE to 4+/5 in order to improve the ability to perform essential ADLs  5) Report decrease in pain by >= 2 points to meet MCID  6) Increase score of LEFS by > 9 points to meet the MCID  7) Ascend and descend 1 flight of stairs reciprocally and safely without an increase in symptoms  8) Be able to perform the ADL of squatting to pick objects up from the floor without an increase or production of symptoms

## 2024-10-04 NOTE — PROGRESS NOTES
I reviewed the resident/fellow's documentation and discussed the patient with the resident/fellow. I agree with the resident/fellow's medical decision making as documented in the note.    Taryn Hoyos MD

## 2024-10-11 ENCOUNTER — TREATMENT (OUTPATIENT)
Dept: PHYSICAL THERAPY | Facility: HOSPITAL | Age: 64
End: 2024-10-11
Payer: MEDICARE

## 2024-10-11 DIAGNOSIS — M25.552 PAIN OF LEFT HIP: ICD-10-CM

## 2024-10-11 PROCEDURE — 97110 THERAPEUTIC EXERCISES: CPT | Mod: GP | Performed by: PHYSICAL THERAPIST

## 2024-10-11 NOTE — PROGRESS NOTES
Physical Therapy Treatment    Patient Name:Lupis Staton  MRN:[id number]  Today's Date:10/11/2024  Referred by: Bouchra Soto  Time Calculation  Start Time: 0902  Stop Time: 0943  Time Calculation (min): 41 min    Therapy Diagnosis  Problem List Items Addressed This Visit    None  Visit Diagnoses         Codes    Pain of left hip     M25.552            Assessment:   Pt  has shown improvements in all objective measures meeting 6 out of 8 goals and progressing towards meeting the remaining 2 goals. Her ROM and strength have improved allowing her to return to independent functional mobility with less complaints of stiffness and pain. She continues to require some cuing and guidance throughout treatment for proper form but reports compliance with HEP. At this time patient is appropriate for discharge to independent [facility] to continue to maintain overall symptom improvements and mobility.     Plan: Discharge to independent [facility].     Insurance  Visit number: 10  Approved number of visits: 30  Onset Date: 6/4/2024  Auth Start Date: 8/1/24  Auth End Date:  11/30/24    Subjective/Pain:  Pt reports she is doing well. Min tightness across low back this date.   Current Pain Level (0-10): 1    HEP Compliance: Good    Objective/Outcome Measures:  Other Measures  Lower Extremity Funtional Score (LEFS): 77/80 =96.3% 9/24  Hip Objective  Palpation: TTP greater trochanter and anterior thigh  Gait: WNL, slight forward trunk lean   R/LFlexibility: significant guarding of L hip, HS tightness 40 deg  AROM (tested in supine):  R/L hip flexion AROM (degrees): 100 deg / 90 deg with pain and stiffness  R/L hip abduction AROM (degrees): WFL  // WFL  with stiffness  R/L hip extension AROM (degrees): 5 deg // 5 deg   R/L hip ER AROM (degrees): 35 deg / 35 deg  R/L hip IR AROM (degrees): 15 deg / 15 deg   R/L knee flexion AROM (degrees): 125 deg  // 125 deg  R/L knee extension AROM (degrees): WNL   MMT:   R/L hip flexion strength (MMT): 4+/5  R/L hip  abduction strength (MMT):5/5   R/L hip adduction strength (MMT): 5/5   R/L hip extension strength (MMT): 4/5 with pain   R/L hip IR strength (MMT): 4+/5   R/L hip ER strength (MMT): 4+/5    Single leg stance time (seconds): R> 5 seconds, L>=< 5 seconds  Functional Squat: Fair, slow and stopping above 90 deg  Step up/Stairs: Reciprocally   Special Tests: CARLEY unable to test due to guarding, Ney's unable to tests due to guarding       Treatment  Therapeutic Procedure PT Therapeutic Procedures Time Entry  Therapeutic Exercise Time Entry: 41 minutes  Therapeutic [id number] minutes  9 mins recumbent bicycle lvl 5  Long sit HS stretch: 1' x 2 R/L  Seated Piriformis s: 1' R/L  Standing forward flexion (feet PF/DF) x 10 ea  TG DL Squat 4 x 10 yellow + blue cord   Standing hip flexor stretch at chair 2 x 30s holds ea    Manual Therapy   minutes    Neuromuscular Re-ed   minutes    Gait Training   minutes    Modalities   Vasopneumatic Device       minutes  Electrical Stimulation            minutes  Ultrasound                      minutes  Iontophoresis                     minutes  Cold Pack                        minutes  Mechanical Traction           minutes    OP EDUCATION:       Goals:  Lower Extremity Goals  Lower Extremity Goals: By discharge, patient will:  1) Demonstrate independence with home exercise program for overall symptom management -MET  2) Increase overall exercise tolerance without adverse reaction or increased chief complaint-MET  3) Increase ROM of the L hip to WFL and pain free in order to improve the ability to perform essential ADLs-PROGRESSING  4) Increase strength of the bilateral LE to 4+/5 in order to improve the ability to perform essential ADLs-MET  5) Report decrease in pain by >= 2 points to meet MCID-MET  6) Increase score of LEFS by > 9 points to meet the MCID -MET  7) Ascend and descend 1 flight of stairs reciprocally and safely without an increase in symptoms -MET  8) Be able to perform  the ADL of squatting to pick objects up from the floor without an increase or production of symptoms-PROGRESSED/MET

## 2024-10-14 ENCOUNTER — APPOINTMENT (OUTPATIENT)
Dept: PHYSICAL THERAPY | Facility: HOSPITAL | Age: 64
End: 2024-10-14
Payer: MEDICARE

## 2024-10-17 ENCOUNTER — APPOINTMENT (OUTPATIENT)
Dept: PRIMARY CARE | Facility: CLINIC | Age: 64
End: 2024-10-17
Payer: COMMERCIAL

## 2024-10-22 ENCOUNTER — APPOINTMENT (OUTPATIENT)
Dept: PHYSICAL THERAPY | Facility: HOSPITAL | Age: 64
End: 2024-10-22
Payer: MEDICARE

## 2025-01-24 DIAGNOSIS — E11.9 TYPE 2 DIABETES MELLITUS WITHOUT COMPLICATION, WITHOUT LONG-TERM CURRENT USE OF INSULIN (MULTI): ICD-10-CM

## 2025-01-24 DIAGNOSIS — E78.5 HYPERLIPIDEMIA, UNSPECIFIED HYPERLIPIDEMIA TYPE: ICD-10-CM

## 2025-01-24 DIAGNOSIS — I10 HYPERTENSION, UNSPECIFIED TYPE: ICD-10-CM

## 2025-01-24 RX ORDER — AMLODIPINE BESYLATE 5 MG/1
5 TABLET ORAL DAILY
Qty: 90 TABLET | Refills: 1 | Status: SHIPPED | OUTPATIENT
Start: 2025-01-24 | End: 2025-07-23

## 2025-01-24 RX ORDER — METFORMIN HYDROCHLORIDE 500 MG/1
500 TABLET, EXTENDED RELEASE ORAL 2 TIMES DAILY
Qty: 180 TABLET | Refills: 3 | Status: SHIPPED | OUTPATIENT
Start: 2025-01-24 | End: 2026-01-24

## 2025-01-24 RX ORDER — LISINOPRIL AND HYDROCHLOROTHIAZIDE 20; 25 MG/1; MG/1
1 TABLET ORAL DAILY
Qty: 90 TABLET | Refills: 3 | Status: SHIPPED | OUTPATIENT
Start: 2025-01-24 | End: 2026-01-24

## 2025-01-24 RX ORDER — ATORVASTATIN CALCIUM 40 MG/1
40 TABLET, FILM COATED ORAL DAILY
Qty: 90 TABLET | Refills: 3 | Status: SHIPPED | OUTPATIENT
Start: 2025-01-24 | End: 2026-01-24

## 2025-01-24 NOTE — TELEPHONE ENCOUNTER
Pt called stating she needed her meds called in d/t pharmacy requested already with no new orders received. No requests noted for any meds recently. Call placed to pt to clarify needed med(s). Pt confirmed need for metformin, lisinopril-hctz, and atorvastatin. Also informed nurse she was supposed to see provider in , but when she called, she was scheduled for March. Nurse rescheduled pt for upcoming Tuesday as double book appt. Nurse noted amlodipine order , as well. Orders pended and routed to provider.

## 2025-01-28 ENCOUNTER — APPOINTMENT (OUTPATIENT)
Facility: HOSPITAL | Age: 65
End: 2025-01-28
Payer: COMMERCIAL

## 2025-03-28 ENCOUNTER — APPOINTMENT (OUTPATIENT)
Facility: HOSPITAL | Age: 65
End: 2025-03-28
Payer: COMMERCIAL

## 2025-04-01 ENCOUNTER — OFFICE VISIT (OUTPATIENT)
Facility: HOSPITAL | Age: 65
End: 2025-04-01
Payer: COMMERCIAL

## 2025-04-01 VITALS
HEART RATE: 108 BPM | SYSTOLIC BLOOD PRESSURE: 112 MMHG | OXYGEN SATURATION: 95 % | DIASTOLIC BLOOD PRESSURE: 71 MMHG | TEMPERATURE: 98.1 F

## 2025-04-01 DIAGNOSIS — I10 HYPERTENSION, UNSPECIFIED TYPE: ICD-10-CM

## 2025-04-01 DIAGNOSIS — M25.552 PAIN OF LEFT HIP: ICD-10-CM

## 2025-04-01 DIAGNOSIS — R68.89 FLU-LIKE SYMPTOMS: Primary | ICD-10-CM

## 2025-04-01 DIAGNOSIS — R35.0 URINARY FREQUENCY: ICD-10-CM

## 2025-04-01 LAB
POC APPEARANCE, URINE: CLEAR
POC BILIRUBIN, URINE: NEGATIVE
POC BLOOD, URINE: ABNORMAL
POC COLOR, URINE: YELLOW
POC GLUCOSE, URINE: NEGATIVE MG/DL
POC KETONES, URINE: NEGATIVE MG/DL
POC LEUKOCYTES, URINE: NEGATIVE
POC NITRITE,URINE: NEGATIVE
POC PH, URINE: 6 PH
POC PROTEIN, URINE: ABNORMAL MG/DL
POC RAPID INFLUENZA A: NEGATIVE
POC RAPID INFLUENZA B: NEGATIVE
POC SPECIFIC GRAVITY, URINE: 1.02
POC UROBILINOGEN, URINE: 1 EU/DL

## 2025-04-01 PROCEDURE — 99214 OFFICE O/P EST MOD 30 MIN: CPT

## 2025-04-01 PROCEDURE — 87804 INFLUENZA ASSAY W/OPTIC: CPT | Mod: QW,GC

## 2025-04-01 PROCEDURE — 81003 URINALYSIS AUTO W/O SCOPE: CPT

## 2025-04-01 PROCEDURE — 87804 INFLUENZA ASSAY W/OPTIC: CPT

## 2025-04-01 PROCEDURE — 3078F DIAST BP <80 MM HG: CPT

## 2025-04-01 PROCEDURE — 1125F AMNT PAIN NOTED PAIN PRSNT: CPT

## 2025-04-01 PROCEDURE — 99214 OFFICE O/P EST MOD 30 MIN: CPT | Mod: GC

## 2025-04-01 PROCEDURE — 3074F SYST BP LT 130 MM HG: CPT

## 2025-04-01 RX ORDER — AMLODIPINE BESYLATE 5 MG/1
5 TABLET ORAL DAILY
Qty: 90 TABLET | Refills: 3 | Status: SHIPPED | OUTPATIENT
Start: 2025-04-01 | End: 2026-04-01

## 2025-04-01 RX ORDER — ACETAMINOPHEN 325 MG/1
650 TABLET ORAL EVERY 6 HOURS PRN
Qty: 30 TABLET | Refills: 0 | Status: SHIPPED | OUTPATIENT
Start: 2025-04-01

## 2025-04-01 ASSESSMENT — PAIN SCALES - GENERAL: PAINLEVEL_OUTOF10: 10-WORST PAIN EVER

## 2025-04-01 NOTE — PROGRESS NOTES
SUBJECTIVE:   Lupis Staton is a 64 y.o. female past medical history of DM2, HLD, HTN who present complaining of flu-like symptoms: fevers, chills, myalgias, congestion, and cough for 2 days. Associated symptoms include increased frequency, suprapubic pain, nausea, & 2 bouts of diarrhea   - Eating & drinking as normal  - took ibuprofen once last night    Denies dysuria, hemturis, no sick contact, sore throat,  dyspnea or wheezing.     Review of Systems  12 pt reviewed negative with exception of symptoms listed in HPI    OBJECTIVE:  /71 (BP Location: Right arm, Patient Position: Sitting, BP Cuff Size: Adult)   Pulse 108   Temp 36.7 °C (98.1 °F) (Temporal)   SpO2 95%     Appears moderately ill but not toxic; temperature as noted in vitals. Ears normal. Throat and pharynx normal.  Neck supple. No adenopathy in the neck. Sinuses non tender. The chest is clear.    ASSESSMENT:  Lupis Staton is a 64 y.o. female with multiple symptoms. Ddx include URI vial infection, gastroenteritis, PNA, UTI. R/o Flu A/B & UTI. Patient with symptoms for two days, no change in p.o. intake, and given she is vitally stable. Will send home with symptomatic therapy & follow up on Monday in walk-in clinic or next Friday if needed. Will consider further workup such as CBC w/diff, procal, & CXR if symptoms persist. Provided symptom precautions that warrant ED visit.    PLAN:  Symptomatic therapy suggested: rest, increase fluids, OTC acetaminophen, ibuprofen, and call prn if symptoms persist or worsen. Call or return to clinic on Monday if systoms persist or symptoms worsen or fail to improve as anticipated.    Diagnoses and all orders for this visit:  Flu-like symptoms  -     POCT Influenza A/B manually resulted-->NEGATIVE  -     Refilled acetaminophen (TylenoL) 325 mg tablet; Take 2 tablets (650 mg) by mouth every 6 hours if needed for mild pain (1 - 3) or fever (temp greater than 38.0 C).  Urinary frequency  -     POCT urinalysis  dipstick manually resulted-->NEGATIVE  -     Urinalysis with Reflex Culture and Microscopic; Future  -     POCT UA Automated manually resulted  Hypertension, unspecified type  -     Refilled amLODIPine (Norvasc) 5 mg tablet; Take 1 tablet (5 mg) by mouth once daily.        Discussed with Dr. Jaswinder Mariscal, DO FM PGY3

## 2025-07-01 ENCOUNTER — APPOINTMENT (OUTPATIENT)
Facility: HOSPITAL | Age: 65
End: 2025-07-01
Payer: COMMERCIAL

## 2025-07-14 ENCOUNTER — OFFICE VISIT (OUTPATIENT)
Dept: OTOLARYNGOLOGY | Facility: HOSPITAL | Age: 65
End: 2025-07-14
Payer: COMMERCIAL

## 2025-07-14 DIAGNOSIS — H61.21 IMPACTED CERUMEN OF RIGHT EAR: ICD-10-CM

## 2025-07-14 DIAGNOSIS — E04.9 GOITER: Primary | ICD-10-CM

## 2025-07-14 PROCEDURE — 1160F RVW MEDS BY RX/DR IN RCRD: CPT | Performed by: NURSE PRACTITIONER

## 2025-07-14 PROCEDURE — 1159F MED LIST DOCD IN RCRD: CPT | Performed by: NURSE PRACTITIONER

## 2025-07-14 PROCEDURE — 99204 OFFICE O/P NEW MOD 45 MIN: CPT | Performed by: NURSE PRACTITIONER

## 2025-07-14 PROCEDURE — 69210 REMOVE IMPACTED EAR WAX UNI: CPT | Performed by: NURSE PRACTITIONER

## 2025-07-14 PROCEDURE — 99214 OFFICE O/P EST MOD 30 MIN: CPT | Mod: 25 | Performed by: NURSE PRACTITIONER

## 2025-07-14 ASSESSMENT — PATIENT HEALTH QUESTIONNAIRE - PHQ9
2. FEELING DOWN, DEPRESSED OR HOPELESS: NOT AT ALL
SUM OF ALL RESPONSES TO PHQ9 QUESTIONS 1 & 2: 0
1. LITTLE INTEREST OR PLEASURE IN DOING THINGS: NOT AT ALL

## 2025-07-14 NOTE — PATIENT INSTRUCTIONS
Chace Staton,  Welcome to the clinic of Kirsty Cho CNP. We are here to assist you through your ENT care at [facility].    My office number is [phone]. This number is the most direct way to communicate with the office. Velia is my  and she answers the office phone from 8am-4pm Mon-Fri. She can help you with many general questions and information. Questions that she cannot answer will be directed appropriately to me. You may need to leave a message. In this case, someone from the team will call you back.    Sometimes other team members will also be involved in your care. This may include dieticians, social workers, speech therapists, medical oncologist or radiation oncologists. I will provide these referrals as needed. Please let me know if you would like to request a specific referral.    I look forward to working with you to meet your healthcare goals.       Recommendations:   -Thyroid ultrasound,will call with abnormal findings  -follow up in 6-8 weeks  -To clean the ears, wash the external ear with a cloth, but do not insert anything into the ear canal.  -Most cases of ear wax blockage respond to home treatments used to soften wax.  -Debrox ordered   -You may try placing a few drops of mineral oil or baby oil once a week to help keep the wax soft. We do NOT recommend placing ANYTHING in the ear canal.  -Doing so may cause wax to be pushed back into the ear canal and stuck. It also risks injury to the ear canal and ear drum.   -We recommend avoiding using cotton tipped applicators, tissues, paper, or any rigid object in the ear canal.   Some people require regular cleanings every year or so to keep the ear canals open.  -Follow up in 3-4 month

## 2025-07-14 NOTE — PROGRESS NOTES
Ear Nose & Throat Clinic Note          25  Lupis Staton is a 64 y.o. year old female patient with cerumen impaction  referred for cerumen removal .      : 1960    HPI:  Lupis Staton is a 64 y.o. female presents with complaints of itching in her left ear.  She denies any change in hearing.  She has noted to have a prominent goiter on the left side of her neck.  Patient states that goiter has been present for many many years.  She has had multiple ultrasounds as well as biopsies to that goiter with the most recent one being done 2 years ago.  Patient did express that she was taking thyroid medication, but upon review of her meds none were seen.  Patient also has not been seen by endocrinology in over 2 years.    Past Medical History  She has a past medical history of Acute lower UTI (urinary tract infection) (2024), Contact with and (suspected) exposure to infections with a predominantly sexual mode of transmission (10/30/2014), Encounter for general adult medical examination without abnormal findings (06/10/2017), Encounter for gynecological examination (general) (routine) without abnormal findings (2017), Encounter for immunization (10/30/2014), Helicobacter pylori antibody positive (2010), Low back pain, unspecified (2019), Personal history of colonic polyps (2017), Personal history of colonic polyps, Personal history of other diseases of the digestive system (2017), Personal history of other medical treatment (2015), Personal history of other specified conditions (2019), Pure hypercholesterolemia, unspecified (2014), and Xerosis cutis (2017).    Surgical History  She has a past surgical history that includes Other surgical history (2019).     Social History  She reports that she has never smoked. She has never used smokeless tobacco. She reports that she does not drink alcohol and does not use drugs.    Family History  Family  History[1]     Allergies  Patient has no known allergies.    Review of Systems  Unless mentioned in the HPI, all other systems have been reviewed and ar negative.    Constitutional:   General appearance: Healthy appearing, well-nourished, well groomed.  No acute distress.  Communication: Normal communication without aids or , normal voice quality.  No hoarseness, stridor or stertor.    Psychiatric: Oriented to person, place and time.  Normal mood and affect.    Neurologic: Cranial nerves II-XII grossly intact and symmetric bilaterally.    Head and Face:  Head: Atraumatic with no masses, lesions or scarring.  Face: Normal symmetry, no paralysis, synkinesis or facial tic.  No scars or deformities.  Sinuses: Palpation of the face revealed no sinus tenderness  Salivary glands: No tenderness of the parotid gland, no parotid masses.  No tenderness of the submandibular glands, no submandibular gland masses.  TMJ: Normal, no clicking or pain with palpation.    Eyes: EOMI, PERRL, conjunctiva not edematous or erythematous    Ears: External inspection of ears with no deformity, scars or masses.  Otoscopic examination: Auditory canals with normal appearance, no cerumen obstruction, erythema or edema on left. The right with cerumen impaction.  Tympanic membranes intact without middle ear effusion on left.  Assessment of hearing with normal clinical speech reception to voice.      Nose: External inspection of nose: No nasal lesions, lacerations or scars.  Septum midline.  No inferior turbinate hypertrophy.  Patent with good air entry bilaterally.    Oral Cavity/Mouth: No masses, lesions or ulcers along the lips, gingival or buccal mucosa.  Floor of the mouth is soft without edema or masses.  Teeth in fair condition.  No masses, lesions or ulcers along the tongue.  Oral cavity and oropharynx mucosa moist.  Hard and soft palate intact and symmetric with no masses or lesions.  Posterior oropharynx patent.  Palate,  posterior pharyngeal walls and tonsils normal, symmetric with no masses, lesions or ulcers.      Neck: Normal appearing, Asymmetric, trachea midline.  No crepitus.  Thyroid: Asymmetrical, left sided enlargement, no tenderness, no nodules.    Lymphatic: No palpable cervical lymphadenopathy, no submandibular lymphadenopathy, no supraclavicular lymphadenopathy.    Cardiovascular: Examination of peripheral vascular system shows no clubbing or cyanosis.    Respiratory: No respiratory distress increased work of breathing.  Inspection of the chest with symmetric chest expansion and normal respiratory effort.    Skin: No rashes in the head or neck        Patient ID: Lupis Staton is a 64 y.o. female.    Procedures  Cerumen Removal  Verbal consent was obtained from the patient. Using a hand held otoscope, a suction, and curette were used to remove cerumen from the EAC. Once removed the TM was noted to be normal. Patient tolerated the procedure well      Assessment:   64-year-old female presents with impacted cerumen in right ear; tolerated cleaning.  Patient seems to also been lost to follow-up for her goiter that has been present since 2017.    Recommendations:   -Thyroid ultrasound,will call with abnormal findings  -follow up in 6-8 weeks  -To clean the ears, wash the external ear with a cloth, but do not insert anything into the ear canal.  -Most cases of ear wax blockage respond to home treatments used to soften wax.  -Debrox ordered   -You may try placing a few drops of mineral oil or baby oil once a week to help keep the wax soft. We do NOT recommend placing ANYTHING in the ear canal.  -Doing so may cause wax to be pushed back into the ear canal and stuck. It also risks injury to the ear canal and ear drum.   -We recommend avoiding using cotton tipped applicators, tissues, paper, or any rigid object in the ear canal.   Some people require regular cleanings every year or so to keep the ear canals open.  -Follow up in 3-4  month     Total time spent today was 45 minutes, all of which was spent coordinating/educating patient at today's visit.      Kirsty Cho, APRN-CNP              [1] No family history on file.

## 2025-07-16 ENCOUNTER — TELEPHONE (OUTPATIENT)
Facility: HOSPITAL | Age: 65
End: 2025-07-16
Payer: COMMERCIAL

## 2025-07-16 NOTE — TELEPHONE ENCOUNTER
I called her.  She wasn't in a place to talk long.  I confirmed with her that she takes amlodipine and a lisinopril-hydrochlorothiazide combination.  Since her Bps have been at goal, and she's asymptomatic, I asked her to continue the same meds until her appointment with Dr. Hoyos on August 13.

## 2025-07-16 NOTE — TELEPHONE ENCOUNTER
Patient called in and wanted , DO or a nurse to call her because when she went to ENT for her ears on 07/14/2025 the ENT provider told her that she was taking two blood pressure pills and she wanted to speak with provider or nurse to see what the 2nd pill is for. Please call Ms. Lupis Staton at [phone]

## 2025-08-13 ENCOUNTER — OFFICE VISIT (OUTPATIENT)
Facility: HOSPITAL | Age: 65
End: 2025-08-13
Payer: COMMERCIAL

## 2025-08-13 VITALS
DIASTOLIC BLOOD PRESSURE: 73 MMHG | OXYGEN SATURATION: 98 % | HEART RATE: 81 BPM | TEMPERATURE: 97.9 F | SYSTOLIC BLOOD PRESSURE: 122 MMHG | WEIGHT: 140 LBS | HEIGHT: 62 IN | BODY MASS INDEX: 25.76 KG/M2

## 2025-08-13 DIAGNOSIS — M25.552 PAIN OF LEFT HIP: ICD-10-CM

## 2025-08-13 DIAGNOSIS — E78.5 HYPERLIPIDEMIA, UNSPECIFIED HYPERLIPIDEMIA TYPE: ICD-10-CM

## 2025-08-13 DIAGNOSIS — E04.9 GOITER: Primary | ICD-10-CM

## 2025-08-13 DIAGNOSIS — I10 HYPERTENSION, UNSPECIFIED TYPE: ICD-10-CM

## 2025-08-13 DIAGNOSIS — E11.9 TYPE 2 DIABETES MELLITUS WITHOUT COMPLICATION, WITHOUT LONG-TERM CURRENT USE OF INSULIN: ICD-10-CM

## 2025-08-13 DIAGNOSIS — Z86.0100 HISTORY OF COLONIC POLYPS: Chronic | ICD-10-CM

## 2025-08-13 DIAGNOSIS — Z78.9 NEVER SMOKED TOBACCO: ICD-10-CM

## 2025-08-13 DIAGNOSIS — Z12.31 ENCOUNTER FOR SCREENING MAMMOGRAM FOR MALIGNANT NEOPLASM OF BREAST: ICD-10-CM

## 2025-08-13 DIAGNOSIS — R69 TAKING MEDICATION FOR CHRONIC DISEASE: ICD-10-CM

## 2025-08-13 PROCEDURE — 99213 OFFICE O/P EST LOW 20 MIN: CPT | Performed by: FAMILY MEDICINE

## 2025-08-13 PROCEDURE — 99212 OFFICE O/P EST SF 10 MIN: CPT

## 2025-08-13 PROCEDURE — 1126F AMNT PAIN NOTED NONE PRSNT: CPT | Performed by: FAMILY MEDICINE

## 2025-08-13 PROCEDURE — 3008F BODY MASS INDEX DOCD: CPT | Performed by: FAMILY MEDICINE

## 2025-08-13 PROCEDURE — G2211 COMPLEX E/M VISIT ADD ON: HCPCS | Performed by: FAMILY MEDICINE

## 2025-08-13 PROCEDURE — 3078F DIAST BP <80 MM HG: CPT | Performed by: FAMILY MEDICINE

## 2025-08-13 PROCEDURE — 1160F RVW MEDS BY RX/DR IN RCRD: CPT | Performed by: FAMILY MEDICINE

## 2025-08-13 PROCEDURE — 3074F SYST BP LT 130 MM HG: CPT | Performed by: FAMILY MEDICINE

## 2025-08-13 PROCEDURE — 1159F MED LIST DOCD IN RCRD: CPT | Performed by: FAMILY MEDICINE

## 2025-08-13 RX ORDER — LISINOPRIL AND HYDROCHLOROTHIAZIDE 20; 25 MG/1; MG/1
1 TABLET ORAL DAILY
Qty: 30 TABLET | Refills: 11 | Status: SHIPPED | OUTPATIENT
Start: 2025-08-13 | End: 2026-08-13

## 2025-08-13 RX ORDER — ATORVASTATIN CALCIUM 40 MG/1
40 TABLET, FILM COATED ORAL DAILY
Qty: 90 TABLET | Refills: 3 | Status: SHIPPED | OUTPATIENT
Start: 2025-08-13 | End: 2026-08-13

## 2025-08-13 RX ORDER — METFORMIN HYDROCHLORIDE 500 MG/1
500 TABLET, EXTENDED RELEASE ORAL 2 TIMES DAILY
Qty: 60 TABLET | Refills: 2 | Status: SHIPPED | OUTPATIENT
Start: 2025-08-13 | End: 2025-11-11

## 2025-08-13 RX ORDER — ACETAMINOPHEN 325 MG/1
650 TABLET ORAL EVERY 6 HOURS PRN
Qty: 30 TABLET | Refills: 2 | Status: SHIPPED | OUTPATIENT
Start: 2025-08-13 | End: 2025-11-11

## 2025-08-13 RX ORDER — AMLODIPINE BESYLATE 5 MG/1
5 TABLET ORAL DAILY
Qty: 30 TABLET | Refills: 11 | Status: SHIPPED | OUTPATIENT
Start: 2025-08-13 | End: 2026-08-13

## 2025-08-13 ASSESSMENT — PAIN SCALES - GENERAL: PAINLEVEL_OUTOF10: 0-NO PAIN

## 2025-08-13 ASSESSMENT — ENCOUNTER SYMPTOMS
FATIGUE: 0
UNEXPECTED WEIGHT CHANGE: 0

## 2025-08-25 DIAGNOSIS — Z12.11 COLON CANCER SCREENING: Primary | ICD-10-CM

## 2025-08-25 RX ORDER — POLYETHYLENE GLYCOL 3350, SODIUM SULFATE, SODIUM CHLORIDE, POTASSIUM CHLORIDE, SODIUM ASCORBATE, AND ASCORBIC ACID 7.5-2.691G
KIT ORAL
Qty: 1 EACH | Refills: 0 | Status: SHIPPED | OUTPATIENT
Start: 2025-08-25

## 2025-08-27 DIAGNOSIS — Z12.11 COLON CANCER SCREENING: Primary | ICD-10-CM

## 2025-08-27 RX ORDER — POLYETHYLENE GLYCOL 3350, SODIUM SULFATE ANHYDROUS, SODIUM BICARBONATE, SODIUM CHLORIDE, POTASSIUM CHLORIDE 236; 22.74; 6.74; 5.86; 2.97 G/4L; G/4L; G/4L; G/4L; G/4L
POWDER, FOR SOLUTION ORAL
Qty: 4000 ML | Refills: 0 | Status: SHIPPED | OUTPATIENT
Start: 2025-08-27

## 2025-09-02 ENCOUNTER — APPOINTMENT (OUTPATIENT)
Dept: RADIOLOGY | Facility: HOSPITAL | Age: 65
End: 2025-09-02
Payer: COMMERCIAL

## 2025-09-10 ENCOUNTER — APPOINTMENT (OUTPATIENT)
Dept: OPHTHALMOLOGY | Facility: CLINIC | Age: 65
End: 2025-09-10
Payer: COMMERCIAL

## 2026-01-14 ENCOUNTER — APPOINTMENT (OUTPATIENT)
Dept: OPHTHALMOLOGY | Facility: CLINIC | Age: 66
End: 2026-01-14
Payer: COMMERCIAL